# Patient Record
Sex: MALE | Race: WHITE | NOT HISPANIC OR LATINO | Employment: FULL TIME | URBAN - METROPOLITAN AREA
[De-identification: names, ages, dates, MRNs, and addresses within clinical notes are randomized per-mention and may not be internally consistent; named-entity substitution may affect disease eponyms.]

---

## 2022-04-01 ENCOUNTER — OFFICE VISIT (OUTPATIENT)
Dept: FAMILY MEDICINE | Facility: CLINIC | Age: 32
End: 2022-04-01
Payer: COMMERCIAL

## 2022-04-01 VITALS
WEIGHT: 273.13 LBS | RESPIRATION RATE: 14 BRPM | DIASTOLIC BLOOD PRESSURE: 89 MMHG | BODY MASS INDEX: 38.24 KG/M2 | OXYGEN SATURATION: 96 % | HEIGHT: 71 IN | HEART RATE: 94 BPM | TEMPERATURE: 98.3 F | SYSTOLIC BLOOD PRESSURE: 139 MMHG

## 2022-04-01 DIAGNOSIS — E66.09 CLASS 2 OBESITY DUE TO EXCESS CALORIES WITHOUT SERIOUS COMORBIDITY WITH BODY MASS INDEX (BMI) OF 38.0 TO 38.9 IN ADULT: ICD-10-CM

## 2022-04-01 DIAGNOSIS — Z11.4 SCREENING FOR HIV (HUMAN IMMUNODEFICIENCY VIRUS): ICD-10-CM

## 2022-04-01 DIAGNOSIS — F32.1 MAJOR DEPRESSIVE DISORDER, SINGLE EPISODE, MODERATE (H): ICD-10-CM

## 2022-04-01 DIAGNOSIS — J45.990 EXERCISE-INDUCED ASTHMA: ICD-10-CM

## 2022-04-01 DIAGNOSIS — F90.2 ATTENTION DEFICIT HYPERACTIVITY DISORDER, COMBINED TYPE: Primary | ICD-10-CM

## 2022-04-01 DIAGNOSIS — Z11.59 NEED FOR HEPATITIS C SCREENING TEST: ICD-10-CM

## 2022-04-01 DIAGNOSIS — R79.89 LOW TESTOSTERONE IN MALE: ICD-10-CM

## 2022-04-01 DIAGNOSIS — F41.1 GENERALIZED ANXIETY DISORDER: ICD-10-CM

## 2022-04-01 DIAGNOSIS — Z86.39 HISTORY OF VITAMIN D DEFICIENCY: ICD-10-CM

## 2022-04-01 DIAGNOSIS — E66.812 CLASS 2 OBESITY DUE TO EXCESS CALORIES WITHOUT SERIOUS COMORBIDITY WITH BODY MASS INDEX (BMI) OF 38.0 TO 38.9 IN ADULT: ICD-10-CM

## 2022-04-01 DIAGNOSIS — H61.23 BILATERAL IMPACTED CERUMEN: ICD-10-CM

## 2022-04-01 PROCEDURE — 82306 VITAMIN D 25 HYDROXY: CPT | Performed by: STUDENT IN AN ORGANIZED HEALTH CARE EDUCATION/TRAINING PROGRAM

## 2022-04-01 PROCEDURE — 84270 ASSAY OF SEX HORMONE GLOBUL: CPT | Performed by: STUDENT IN AN ORGANIZED HEALTH CARE EDUCATION/TRAINING PROGRAM

## 2022-04-01 PROCEDURE — 69209 REMOVE IMPACTED EAR WAX UNI: CPT | Mod: 50 | Performed by: STUDENT IN AN ORGANIZED HEALTH CARE EDUCATION/TRAINING PROGRAM

## 2022-04-01 PROCEDURE — 84403 ASSAY OF TOTAL TESTOSTERONE: CPT | Performed by: STUDENT IN AN ORGANIZED HEALTH CARE EDUCATION/TRAINING PROGRAM

## 2022-04-01 PROCEDURE — 96127 BRIEF EMOTIONAL/BEHAV ASSMT: CPT | Performed by: STUDENT IN AN ORGANIZED HEALTH CARE EDUCATION/TRAINING PROGRAM

## 2022-04-01 PROCEDURE — 86803 HEPATITIS C AB TEST: CPT | Performed by: STUDENT IN AN ORGANIZED HEALTH CARE EDUCATION/TRAINING PROGRAM

## 2022-04-01 PROCEDURE — 99204 OFFICE O/P NEW MOD 45 MIN: CPT | Mod: 25 | Performed by: STUDENT IN AN ORGANIZED HEALTH CARE EDUCATION/TRAINING PROGRAM

## 2022-04-01 PROCEDURE — 36415 COLL VENOUS BLD VENIPUNCTURE: CPT | Performed by: STUDENT IN AN ORGANIZED HEALTH CARE EDUCATION/TRAINING PROGRAM

## 2022-04-01 PROCEDURE — 87389 HIV-1 AG W/HIV-1&-2 AB AG IA: CPT | Performed by: STUDENT IN AN ORGANIZED HEALTH CARE EDUCATION/TRAINING PROGRAM

## 2022-04-01 RX ORDER — ALPRAZOLAM 1 MG
1 TABLET ORAL PRN
COMMUNITY
End: 2022-12-13

## 2022-04-01 RX ORDER — ALBUTEROL SULFATE 90 UG/1
2 AEROSOL, METERED RESPIRATORY (INHALATION) EVERY 6 HOURS
Qty: 18 G | Refills: 11 | Status: SHIPPED | OUTPATIENT
Start: 2022-04-01 | End: 2022-08-15

## 2022-04-01 RX ORDER — DEXTROAMPHETAMINE SACCHARATE, AMPHETAMINE ASPARTATE MONOHYDRATE, DEXTROAMPHETAMINE SULFATE AND AMPHETAMINE SULFATE 5; 5; 5; 5 MG/1; MG/1; MG/1; MG/1
20 CAPSULE, EXTENDED RELEASE ORAL 2 TIMES DAILY
COMMUNITY
End: 2022-04-01

## 2022-04-01 RX ORDER — DEXTROAMPHETAMINE SACCHARATE, AMPHETAMINE ASPARTATE MONOHYDRATE, DEXTROAMPHETAMINE SULFATE AND AMPHETAMINE SULFATE 5; 5; 5; 5 MG/1; MG/1; MG/1; MG/1
20 CAPSULE, EXTENDED RELEASE ORAL EVERY MORNING
Qty: 30 CAPSULE | Refills: 0 | Status: SHIPPED | OUTPATIENT
Start: 2022-04-01 | End: 2022-04-19

## 2022-04-01 RX ORDER — PROPRANOLOL HYDROCHLORIDE 10 MG/1
TABLET ORAL PRN
COMMUNITY
End: 2022-10-11

## 2022-04-01 RX ORDER — ALBUTEROL SULFATE 1.25 MG/3ML
1.25 SOLUTION RESPIRATORY (INHALATION) EVERY 6 HOURS PRN
COMMUNITY
End: 2022-06-17

## 2022-04-01 RX ORDER — DEXTROAMPHETAMINE SACCHARATE, AMPHETAMINE ASPARTATE, DEXTROAMPHETAMINE SULFATE AND AMPHETAMINE SULFATE 5; 5; 5; 5 MG/1; MG/1; MG/1; MG/1
20 TABLET ORAL 3 TIMES DAILY
COMMUNITY
End: 2022-06-17

## 2022-04-01 ASSESSMENT — PATIENT HEALTH QUESTIONNAIRE - PHQ9
5. POOR APPETITE OR OVEREATING: MORE THAN HALF THE DAYS
SUM OF ALL RESPONSES TO PHQ QUESTIONS 1-9: 13

## 2022-04-01 ASSESSMENT — ANXIETY QUESTIONNAIRES
5. BEING SO RESTLESS THAT IT IS HARD TO SIT STILL: NEARLY EVERY DAY
3. WORRYING TOO MUCH ABOUT DIFFERENT THINGS: SEVERAL DAYS
2. NOT BEING ABLE TO STOP OR CONTROL WORRYING: SEVERAL DAYS
6. BECOMING EASILY ANNOYED OR IRRITABLE: SEVERAL DAYS
GAD7 TOTAL SCORE: 10
7. FEELING AFRAID AS IF SOMETHING AWFUL MIGHT HAPPEN: SEVERAL DAYS
1. FEELING NERVOUS, ANXIOUS, OR ON EDGE: SEVERAL DAYS
IF YOU CHECKED OFF ANY PROBLEMS ON THIS QUESTIONNAIRE, HOW DIFFICULT HAVE THESE PROBLEMS MADE IT FOR YOU TO DO YOUR WORK, TAKE CARE OF THINGS AT HOME, OR GET ALONG WITH OTHER PEOPLE: SOMEWHAT DIFFICULT

## 2022-04-01 ASSESSMENT — PAIN SCALES - GENERAL: PAINLEVEL: NO PAIN (0)

## 2022-04-01 ASSESSMENT — ASTHMA QUESTIONNAIRES: ACT_TOTALSCORE: 24

## 2022-04-01 NOTE — LETTER
[facility]    [address] [place] [address]  [phone]                                   April 6, 2022    Justin Tolbert  [address] [address]        Dear Justin,    The results of your recent labs were within normal limits.    Results for orders placed or performed in visit on 04/01/22   Vitamin D Deficiency     Status: Normal   Result Value Ref Range    Vitamin D, Total (25-Hydroxy) 35 20 - 75 ug/L    Narrative    Season, race, dietary intake, and treatment affect the concentration of 25-hydroxy-Vitamin D. Values may decrease during winter months and increase during summer months. Values 20-29 ug/L may indicate Vitamin D insufficiency and values <20 ug/L may indicate Vitamin D deficiency.    Vitamin D determination is routinely performed by an immunoassay specific for 25 hydroxyvitamin D3.  If an individual is on vitamin D2(ergocalciferol) supplementation, please specify 25 OH vitamin D2 and D3 level determination by LCMSMS test VITD23.     Hepatitis C Screen Reflex to HCV RNA Quant and Genotype     Status: Normal   Result Value Ref Range    Hepatitis C Antibody Nonreactive Nonreactive    Narrative    Assay performance characteristics have not been established for newborns, infants, and children.   HIV Antigen Antibody Combo     Status: Normal   Result Value Ref Range    HIV Antigen Antibody Combo Nonreactive Nonreactive   Sex Hormone Binding Globulin     Status: Normal   Result Value Ref Range    Sex Hormone Binding Globulin 20 11 - 80 nmol/L   Testosterone Free and Total     Status: None   Result Value Ref Range    Free Testosterone Calculated 9.92 ng/dL    Testosterone Total 380 240 - 950 ng/dL    Narrative    This test was developed and its performance characteristics determined by the [facility],  Special Chemistry Laboratory. It has not been cleared or approved by the FDA. The laboratory is regulated under CLIA as qualified to perform  high-complexity testing. This test is used for clinical purposes. It should not be regarded as investigational or for research.   Testosterone Free and Total     Status: None    Narrative    The following orders were created for panel order Testosterone Free and Total.  Procedure                               Abnormality         Status                     ---------                               -----------         ------                     Sex Hormone Binding Glob...[[id number]]  Normal              Final result               Testosterone Free and Total[[id number]]                      Final result                 Please view results for these tests on the individual orders.   Urine Drugs of Abuse Screen *Canceled*     Status: None ()    Narrative    The following orders were created for panel order Urine Drugs of Abuse Screen.  Procedure                               Abnormality         Status                     ---------                               -----------         ------                     Urine Drugs of Abuse Scr...[[id number]]                                                   Please view results for these tests on the individual orders.       If you have any questions please call the clinic at [phone]    Sincerely,    Estephania Mg MD,MPH  bmd

## 2022-04-01 NOTE — PROGRESS NOTES
"  Assessment & Plan   1. Attention deficit hyperactivity disorder, combined type  uncontrolled.  First diagnosed at the age of 24.  - Urine Drugs of Abuse Screen; Future  - amphetamine-dextroamphetamine (ADDERALL XR) 20 MG 24 hr capsule; Take 1 capsule (20 mg) by mouth every morning  Dispense: 30 capsule; Refill: 0    2. Screening for HIV (human immunodeficiency virus)    - HIV Antigen Antibody Combo; Future  - HIV Antigen Antibody Combo    3. Need for hepatitis C screening test    - Hepatitis C Screen Reflex to HCV RNA Quant and Genotype; Future  - Hepatitis C Screen Reflex to HCV RNA Quant and Genotype    4. History of vitamin D deficiency  - Vitamin D Deficiency; Future  - Vitamin D Deficiency    5. Low testosterone in male    - Testosterone Free and Total; Future, done at 10 AM  - Testosterone Free and Total    6. Exercise-induced asthma  controlled  - albuterol (PROAIR HFA/PROVENTIL HFA/VENTOLIN HFA) 108 (90 Base) MCG/ACT inhaler; Inhale 2 puffs into the lungs every 6 hours  Dispense: 18 g; Refill: 11    7. Bilateral impacted cerumen    - CT REMOVAL IMPACTED CERUMEN IRRIGATION/LVG UNILAT  #8 generalized anxiety  He Is requesting for alprazolam.  Advised patient that alprazolam is not the best medication to control his symptoms due its side effect.  Recommended starting SNRI however patient declined at this time and he is open to revisiting this at his next visit  9.  Moderate major depression  As above.  He  Has idone therapy in the past.  In addition to pharmacotherapy, I recommended psychotherapy for ADHD, generalized anxiety and moderate depression.  Patient declined at this time.   He is open to revisiting this at his next visit        BMI:   Estimated body mass index is 38.64 kg/m  as calculated from the following:    Height as of this encounter: 1.791 m (5' 10.5\").    Weight as of this encounter: 123.9 kg (273 lb 2 oz).   Weight management plan: Discussed healthy diet and exercise guidelines    Depression " Screening Follow Up    PHQ 4/1/2022   PHQ-9 Total Score 13   Q9: Thoughts of better off dead/self-harm past 2 weeks Not at all     Last PHQ-9 4/1/2022   1.  Little interest or pleasure in doing things 1   2.  Feeling down, depressed, or hopeless 2   3.  Trouble falling or staying asleep, or sleeping too much 3   4.  Feeling tired or having little energy 1   5.  Poor appetite or overeating 0   6.  Feeling bad about yourself 1   7.  Trouble concentrating 3   8.  Moving slowly or restless 2   Q9: Thoughts of better off dead/self-harm past 2 weeks 0   PHQ-9 Total Score 13   Difficulty at work, home, or with people Somewhat difficult       Follow Up Actions Taken  Patient counseled, no additional follow up at this time.  Depression Action Plan reviewed with patient.  Patient declined referral.       Return in about 1 week (around 4/8/2022).    Estephania Mg MD  [facility] IVÁN Anderson is a 31 year old who presents for the following health issues     History of Present Illness     Asthma:  He presents for follow up of asthma.  He has no cough, no wheezing, and no shortness of breath. He is using a relief medication a few times a month. He does not miss any doses of his controller medication throughout the week.Patient is aware of the following triggers: exercise or sports. The patient has not had a visit to the Emergency Room, Urgent Care or Hospital due to asthma since the last clinic visit.     He eats 2-3 servings of fruits and vegetables daily.He consumes 2 sweetened beverage(s) daily.He exercises with enough effort to increase his heart rate 30 to 60 minutes per day.  He exercises with enough effort to increase his heart rate 5 days per week.   He is taking medications regularly.       Medication Followup of Adderall     Taking Medication as prescribed: pt does not have medication    Side Effects:  None    Medication Helping Symptoms:  yes     Please answer the questions  below, rating yourself on each of the criteria shown using the scale on the right side of the page. As you answer each question, place an X in the box that best describes how you have felt and conducted yourself over the past 6 months.     Never Rarely Sometimes Often Very Often   1 How often do you have trouble wrapping up the final details of a project once the challenging parts have been done?    x    2 How often do you have difficulty getting things in order when you have to do a task that requires organization?    x    3 How often do you have problems remembering appointments or obligations?     x   4 When you have a task that requires a lot of thought, how often do you avoid or delay getting started?     x   5 How often do you fidget or squirm with your hands or feet when you have to sit down for a long time?    x    6 How often do you feel overly active and compelled to do things, like you were driven by a motor?    x    7 How often do you make careless mistakes when you have to work on a boring or difficult project?     x   8 How often do you have difficulty keeping your attention when you are doing boring or repetitive work?     x   9 How often do you have difficulty concentrating on what people say to you, even when they are speaking to you directly?     x   10 How often do you misplace or have difficulty finding things at home or at work?     x   11 How often are you distracted by activity or noise around you?     x   12 How often do you leave your seat in meetings or other situations in which you are expected to remain seated?   x     13 How often do you feel restless or fidgety?     x   14 How often do you have difficulty unwinding and relaxing when you have time to yourself?     x   15 How often do you find yourself talking too much when you are in social situations?    x    16 When you're in a conversation, how often do you find yourself finishing the sentences of the people you are talking to, before  "they can finish them themselves?     x   17 How often do you have difficulty waiting your turn in situations when turn-taking is required?    x    18 How often do you interrupt others when they are busy?   x       Depression/anxiety: Patient denies suicidal homicidal ideation.  Patient was on alprazolam as needed in the past.  he has not being on medication since the start of Covid.  He has been self managing it    He takes marijuana once in a while.  Patient reports history of low testosterone  Obesity: Comorbid  Includes asthma.    Concern:  Pt would like to have R ear looked at. Plugged X 1 week. Ear pain. OTC pain reliever.     Review of Systems   Constitutional, HEENT, cardiovascular, pulmonary, gi and gu systems are negative, except as otherwise noted.      Objective    BP (!) 150/91   Pulse 94   Temp 98.3  F (36.8  C) (Tympanic)   Resp 14   Ht 1.791 m (5' 10.5\")   Wt 123.9 kg (273 lb 2 oz)   SpO2 96%   BMI 38.64 kg/m    Body mass index is 38.64 kg/m .  Physical Exam   GENERAL: healthy, alert and no distress  Ear: Cerumen impaction bilaterally  NECK: no adenopathy and thyroid normal to palpation  RESP: lungs clear to auscultation - no rales, rhonchi or wheezes  CV: regular rate and rhythm, normal S1 S2, no S3 or S4,   ABDOMEN: soft, nontender, no hepatosplenomegaly, no masses and bowel sounds normal  MS: no gross musculoskeletal defects noted, no edema  SKIN: no suspicious lesions or rashes  NEURO:  speech normal  PSYCH: mentation appears normal, affect normal/bright        "

## 2022-04-01 NOTE — LETTER
Opioid / Opioid Plus Controlled Substance Agreement    This is an agreement between you and your provider about the safe and appropriate use of controlled substance/opioids prescribed by your care team. Controlled substances are medicines that can cause physical and mental dependence (abuse).    There are strict laws about having and using these medicines. We here at [facility] are committing to working with you in your efforts to get better. To support you in this work, we ll help you schedule regular office appointments for medicine refills. If we must cancel or change your appointment for any reason, we ll make sure you have enough medicine to last until your next appointment.     As a Provider, I will:    Listen carefully to your concerns and treat you with respect.     Recommend a treatment plan that I believe is in your best interest. This plan may involve therapies other than opioid pain medication.     Talk with you often about the possible benefits, and the risk of harm of any medicine that we prescribe for you.     Provide a plan on how to taper (discontinue or go off) using this medicine if the decision is made to stop its use.    As a Patient, I understand that opioid(s):     Are a controlled substance prescribed by my care team to help me function or work and manage my condition(s).     Are strong medicines and can cause serious side effects such as:    Drowsiness, which can seriously affect my driving ability    A lower breathing rate, enough to cause death    Harm to my thinking ability     Depression     Abuse of and addiction to this medicine    Need to be taken exactly as prescribed. Combining opioids with certain medicines or chemicals (such as illegal drugs, sedatives, sleeping pills, and benzodiazepines) can be dangerous or even fatal. If I stop opioids suddenly, I may have severe withdrawal symptoms.    Do not work for all types of pain nor for all patients. If they re not helpful, I may  be asked to stop them.        The risks, benefits and side effects of these medicine(s) were explained to me. I agree that:  1. I will take part in other treatments as advised by my care team. This may be psychiatry or counseling, physical therapy, behavioral therapy, group treatment or a referral to a specialist.     2. I will keep all my appointments. I understand that this is part of the monitoring of opioids. My care team may require an office visit for EVERY opioid/controlled substance refill. If I miss appointments or don t follow instructions, my care team may stop my medicine.    3. I will take my medicines as prescribed. I will not change the dose or schedule unless my care team tells me to. There will be no refills if I run out early.     4. I may be asked to come to the clinic and complete a urine drug test or complete a pill count at any time. If I don t give a urine sample or participate in a pill count, the care team may stop my medicine.    5. I will only receive prescriptions from this clinic for chronic pain. If I am treated by another provider for acute pain issues, I will tell them that I am taking opioid pain medication for chronic pain and that I have a treatment agreement with this provider. I will inform my [facility] care team within one business day if I am given a prescription for any pain medication by another healthcare provider. My [facility] care team can contact other providers and pharmacists about my use of any medicines.    6. It is up to me to make sure that I don t run out of my medicines on weekends or holidays. If my care team is willing to refill my opioid prescription without a visit, I must request refills only during office hours. Refills may take up to 3 business days to process. I will use one pharmacy to fill all my opioid and other controlled substance prescriptions. I will notify the clinic about any changes to my insurance or medication  availability.    7. I am responsible for my prescriptions. If the medicine/prescription is lost, stolen or destroyed, it will not be replaced. I also agree not to share controlled substance medicines with anyone.    8. I am aware I should not use any illegal or recreational drugs. I agree not to drink alcohol unless my care team says I can.       9. If I enroll in the [place] [facility] Cannabis program, I will tell my care team prior to my next refill.     10. I will tell my care team right away if I become pregnant, have a new medical problem treated outside of my regular clinic, or have a change in my medications.    11. I understand that this medicine can affect my thinking, judgment and reaction time. Alcohol and drugs affect the brain and body, which can affect the safety of my driving. Being under the influence of alcohol or drugs can affect my decision-making, behaviors, personal safety, and the safety of others. Driving while impaired (DWI) can occur if a person is driving, operating, or in physical control of a car, motorcycle, boat, snowmobile, ATV, motorbike, off-road vehicle, or any other motor vehicle ([place] Statute 169A.20). I understand the risk if I choose to drive or operate any vehicle or machinery.    I understand that if I do not follow any of the conditions above, my prescriptions or treatment may be stopped or changed.          Opioids  What You Need to Know    What are opioids?   Opioids are pain medicines that must be prescribed by a doctor. They are also known as narcotics.     Examples are:   1. morphine (MS Contin, Carissa)  2. oxycodone (Oxycontin)  3. oxycodone and acetaminophen (Percocet)  4. hydrocodone and acetaminophen (Vicodin, Norco)   5. fentanyl patch (Duragesic)   6. hydromorphone (Dilaudid)   7. methadone  8. codeine (Tylenol #3)     What do opioids do well?   Opioids are best for severe short-term pain such as after a surgery or injury. They may work well for cancer pain. They may  help some people with long-lasting (chronic) pain.     What do opioids NOT do well?   Opioids never get rid of pain entirely, and they don t work well for most patients with chronic pain. Opioids don t reduce swelling, one of the causes of pain.                                    Other ways to manage chronic pain and improve function include:       Treat the health problem that may be causing pain    Anti-inflammation medicines, which reduce swelling and tenderness, such as ibuprofen (Advil, Motrin) or naproxen (Aleve)    Acetaminophen (Tylenol)    Antidepressants and anti-seizure medicines, especially for nerve pain    Topical treatments such as patches or creams    Injections or nerve blocks    Chiropractic or osteopathic treatment    Acupuncture, massage, deep breathing, meditation, visual imagery, aromatherapy    Use heat or ice at the pain site    Physical therapy     Exercise    Stop smoking    Take part in therapy       Risks and side effects     Talk to your doctor before you start or decide to keep taking opioids. Possible side effects include:      Lowering your breathing rate enough to cause death    Overdose, including death, especially if taking higher than prescribed doses    Worse depression symptoms; less pleasure in things you usually enjoy    Feeling tired or sluggish    Slower thoughts or cloudy thinking    Being more sensitive to pain over time; pain is harder to control    Trouble sleeping or restless sleep    Changes in hormone levels (for example, less testosterone)    Changes in sex drive or ability to have sex    Constipation    Unsafe driving    Itching and sweating    Dizziness    Nausea, throwing up and dry mouth    What else should I know about opioids?    Opioids may lead to dependence, tolerance, or addiction.      Dependence means that if you stop or reduce the medicine too quickly, you will have withdrawal symptoms. These include loose poop (diarrhea), jitters, flu-like symptoms,  nervousness and tremors. Dependence is not the same as addiction.                       Tolerance means needing higher doses over time to get the same effect. This may increase the chance of serious side effects.      Addiction is when people improperly use a substance that harms their body, their mind or their relations with others. Use of opiates can cause a relapse of addiction if you have a history of drug or alcohol abuse.      People who have used opioids for a long time may have a lower quality of life, worse depression, higher levels of pain and more visits to doctors.    You can overdose on opioids. Take these steps to lower your risk of overdose:    1. Recognize the signs:  Signs of overdose include decrease or loss of consciousness (blackout), slowed breathing, trouble waking up and blue lips. If someone is worried about overdose, they should call 911.    2. Talk to your doctor about Narcan (naloxone).   If you are at risk for overdose, you may be given a prescription for Narcan. This medicine very quickly reverses the effects of opioids.   If you overdose, a friend or family member can give you Narcan while waiting for the ambulance. They need to know the signs of overdose and how to give Narcan.     3. Don't use alcohol or street drugs.   Taking them with opioids can cause death.    4. Do not take any of these medicines unless your doctor says it s OK. Taking these with opioids can cause death:    Benzodiazepines, such as lorazepam (Ativan), alprazolam (Xanax) or diazepam (Valium)    Muscle relaxers, such as cyclobenzaprine (Flexeril)    Sleeping pills like zolpidem (Ambien)     Other opioids      How to keep you and other people safe while taking opioids:    1. Never share your opioids with others.  Opioid medicines are regulated by the Drug Enforcement Agency (REGINO). Selling or sharing medications is a criminal act.    2. Be sure to store opioids in a secure place, locked up if possible. Young children  can easily swallow them and overdose.    3. When you are traveling with your medicines, keep them in the original bottles. If you use a pill box, be sure you also carry a copy of your medicine list from your clinic or pharmacy.    4. Safe disposal of opioids    Most pharmacies have places to get rid of medicine, called disposal kiosks. Medicine disposal options are also available in every [facility]. Search your county and  medication disposal  to find more options. You can find more details at:  [email or web address]     I agree that my provider, clinic care team, and pharmacy may work with any city, state or federal law enforcement agency that investigates the misuse, sale, or other diversion of my controlled medicine. I will allow my provider to discuss my care with, or share a copy of, this agreement with any other treating provider, pharmacy or emergency room where I receive care.    I have read this agreement and have asked questions about anything I did not understand.    _______________________________________________________  Patient Signature - Justin Tolbert _____________________                   Date     _______________________________________________________  Provider Signature - Estephania Mg MD   _____________________                   Date     _______________________________________________________  Witness Signature (required if provider not present while patient signing)   _____________________                   Date

## 2022-04-01 NOTE — PATIENT INSTRUCTIONS
Robe Anderson,    Thank you for allowing [facility] to manage your care.    I ordered some blood work, please go to the laboratory to get your laboratory studies.    I sent your prescriptions to your pharmacy.      For your convenience, test results are released as soon as they are available  Please allow 1-2 business days for me to send you a comment about your results.  If not done so, I encourage you to login into [employer] ([email or web address] to review your results in real time.     If you have any questions or concerns, please feel free to call us at [phone].    Sincerely,    Dr. Mg    Did you know?      You can schedule a video visit for follow-up appointments as well as future appointments for certain conditions.  Please see the below link.     [email or web address]    If you have not already done so,  I encourage you to sign up for [employer] ([email or web address]).  This will allow you to review your results, securely communicate with a provider, and schedule virtual visits as well.

## 2022-04-02 ASSESSMENT — ANXIETY QUESTIONNAIRES: GAD7 TOTAL SCORE: 10

## 2022-04-03 LAB
DEPRECATED CALCIDIOL+CALCIFEROL SERPL-MC: 35 UG/L (ref 20–75)
HCV AB SERPL QL IA: NONREACTIVE
HIV 1+2 AB+HIV1 P24 AG SERPL QL IA: NONREACTIVE
SHBG SERPL-SCNC: 20 NMOL/L (ref 11–80)

## 2022-04-05 LAB
TESTOST FREE SERPL-MCNC: 9.92 NG/DL
TESTOST SERPL-MCNC: 380 NG/DL (ref 240–950)

## 2022-04-19 ENCOUNTER — VIRTUAL VISIT (OUTPATIENT)
Dept: FAMILY MEDICINE | Facility: CLINIC | Age: 32
End: 2022-04-19
Payer: COMMERCIAL

## 2022-04-19 DIAGNOSIS — R79.89 LOW TESTOSTERONE IN MALE: ICD-10-CM

## 2022-04-19 DIAGNOSIS — F90.2 ATTENTION DEFICIT HYPERACTIVITY DISORDER, COMBINED TYPE: Primary | ICD-10-CM

## 2022-04-19 DIAGNOSIS — M79.671 RIGHT FOOT PAIN: ICD-10-CM

## 2022-04-19 PROCEDURE — 99214 OFFICE O/P EST MOD 30 MIN: CPT | Mod: 95 | Performed by: PHYSICIAN ASSISTANT

## 2022-04-19 RX ORDER — DEXTROAMPHETAMINE SACCHARATE, AMPHETAMINE ASPARTATE MONOHYDRATE, DEXTROAMPHETAMINE SULFATE AND AMPHETAMINE SULFATE 5; 5; 5; 5 MG/1; MG/1; MG/1; MG/1
20 CAPSULE, EXTENDED RELEASE ORAL 2 TIMES DAILY
Qty: 60 CAPSULE | Refills: 0 | Status: SHIPPED | OUTPATIENT
Start: 2022-04-19 | End: 2022-05-09

## 2022-04-19 NOTE — PROGRESS NOTES
Justin is a 31 year old who is being evaluated via a billable video visit.      How would you like to obtain your AVS? MyChart  If the video visit is dropped, the invitation should be resent by: Text to cell phone: [phone]  Will anyone else be joining your video visit? No    Video Start Time: 2:09 PM        Subjective   Justin is a 31 year old who presents for the following health issues    History of Present Illness       Reason for visit:  Medication Correction + Fill  Symptom onset:  More than a month  Symptoms include:  ADHD  Symptom intensity:  Severe  Symptom progression:  Staying the same  Had these symptoms before:  Yes  Has tried/received treatment for these symptoms:  Yes  Previous treatment was successful:  Yes  Prior treatment description:  ((2) 20mg XR + (2) 10mg IR)/day of Adderall;  What makes it worse:  Alternatives such as Vyvanse and Concerta- I broke out in rashes  What makes it better:  Taking my medication on a daily basis as my psychiatrist I worked with to find what worked for me in regards to medication    He eats 4 or more servings of fruits and vegetables daily.He consumes 1 sweetened beverage(s) daily.He exercises with enough effort to increase his heart rate 30 to 60 minutes per day.  He exercises with enough effort to increase his heart rate 5 days per week.   He is taking medications regularly.     Started back on adderall xr 20 mg daily. Has helped some, but still some inattention issues. Doing a little better with task completion.     Please answer the questions below, rating yourself on each of the criteria shown using the scale on the right side of the page. As you answer each question, place an X in the box that best describes how you have felt and conducted yourself over the past 6 months.     Never Rarely Sometimes Often Very Often   1 How often do you have trouble wrapping up the final details of a project once the challenging parts have been done?     x   2 How often do you  have difficulty getting things in order when you have to do a task that requires organization?     x   3 How often do you have problems remembering appointments or obligations?     x   4 When you have a task that requires a lot of thought, how often do you avoid or delay getting started?     x   5 How often do you fidget or squirm with your hands or feet when you have to sit down for a long time?    x    6 How often do you feel overly active and compelled to do things, like you were driven by a motor?    x    7 How often do you make careless mistakes when you have to work on a boring or difficult project?    x    8 How often do you have difficulty keeping your attention when you are doing boring or repetitive work?     x   9 How often do you have difficulty concentrating on what people say to you, even when they are speaking to you directly?     x   10 How often do you misplace or have difficulty finding things at home or at work?     x   11 How often are you distracted by activity or noise around you?     x   12 How often do you leave your seat in meetings or other situations in which you are expected to remain seated?   x     13 How often do you feel restless or fidgety?     x   14 How often do you have difficulty unwinding and relaxing when you have time to yourself?     x   15 How often do you find yourself talking too much when you are in social situations?     x   16 When you're in a conversation, how often do you find yourself finishing the sentences of the people you are talking to, before they can finish them themselves?     x   17 How often do you have difficulty waiting your turn in situations when turn-taking is required?     x   18 How often do you interrupt others when they are busy?   x         Review of Systems   Constitutional, HEENT, cardiovascular, pulmonary, GI, , musculoskeletal, neuro, skin, endocrine and psych systems are negative, except as otherwise noted.      Objective            Vitals:  No vitals were obtained today due to virtual visit.    Physical Exam   GENERAL: Healthy, alert and no distress  EYES: Eyes grossly normal to inspection.  No discharge or erythema, or obvious scleral/conjunctival abnormalities.  RESP: No audible wheeze, cough, or visible cyanosis.  No visible retractions or increased work of breathing.    SKIN: Visible skin clear. No significant rash, abnormal pigmentation or lesions.  NEURO: Cranial nerves grossly intact.  Mentation and speech appropriate for age.  PSYCH: Mentation appears normal, affect normal/bright, judgement and insight intact, normal speech and appearance well-groomed.    Justin was seen today for a.d.h.d.    Diagnoses and all orders for this visit:    Attention deficit hyperactivity disorder, combined type  -     amphetamine-dextroamphetamine (ADDERALL XR) 20 MG 24 hr capsule; Take 1 capsule (20 mg) by mouth 2 times daily    Low testosterone in male  -     Testosterone Free and Total; Future    Right foot pain  -     diclofenac (VOLTAREN) 1 % topical gel; Apply 4 g topically 4 times daily      Increase dose of adderall xr to one 20 mg tab bid.  Recheck in 1 mos     Video-Visit Details    Type of service:  Video Visit    Video End Time:2:38 PM    Originating Location (pt. Location): Home    Distant Location (provider location):  [facility]     Platform used for Video Visit: [id number]

## 2022-05-06 ENCOUNTER — TELEPHONE (OUTPATIENT)
Dept: FAMILY MEDICINE | Facility: CLINIC | Age: 32
End: 2022-05-06
Payer: COMMERCIAL

## 2022-05-06 NOTE — TELEPHONE ENCOUNTER
Per Beba, patient picked this up on 04/19/22.      Disp Refills Start End CHELSEY   amphetamine-dextroamphetamine (ADDERALL XR) 20 MG 24 hr capsule 60 capsule 0 4/19/2022

## 2022-05-06 NOTE — TELEPHONE ENCOUNTER
Patient stated that he had an appointment with provider yesterday and one of his meds were not sent to the pharmacy.    He stated that Adderall 10 mg tabs were sent, but the Adderall 20 mg extended release tabs were not.      Routed to PCP to please advise      Marielle TAPIA,BSN  Triage Nurse  [facility]: [facility]  Ph: [phone]

## 2022-05-06 NOTE — TELEPHONE ENCOUNTER
Call pharmacy and see when his adderall xr 20 mg tabs were last refilled. According to epic they were filled last on 4/19 which means he's not due till 5/19

## 2022-05-06 NOTE — TELEPHONE ENCOUNTER
Called [facility] pharmacy and spoke with Olaf. Informed Olaf per Joe Patel refill request too soon.  Olaf stated she will let patient know.

## 2022-05-09 ENCOUNTER — MYC REFILL (OUTPATIENT)
Dept: FAMILY MEDICINE | Facility: CLINIC | Age: 32
End: 2022-05-09
Payer: COMMERCIAL

## 2022-05-09 DIAGNOSIS — F90.2 ATTENTION DEFICIT HYPERACTIVITY DISORDER, COMBINED TYPE: ICD-10-CM

## 2022-05-09 NOTE — TELEPHONE ENCOUNTER
Patient called needs ASAP going out of town for work and will run out .  Gabriella Valdivia  [employer],

## 2022-05-10 RX ORDER — DEXTROAMPHETAMINE SACCHARATE, AMPHETAMINE ASPARTATE MONOHYDRATE, DEXTROAMPHETAMINE SULFATE AND AMPHETAMINE SULFATE 5; 5; 5; 5 MG/1; MG/1; MG/1; MG/1
20 CAPSULE, EXTENDED RELEASE ORAL 2 TIMES DAILY
Qty: 60 CAPSULE | Refills: 0 | Status: SHIPPED | OUTPATIENT
Start: 2022-05-10 | End: 2022-06-17

## 2022-05-17 NOTE — TELEPHONE ENCOUNTER
Patient calling to ask if script refilled?  Informed patient script refilled 5/10/22. He will call his jah'sophia Martinez RN  [facility]

## 2022-05-22 ENCOUNTER — HEALTH MAINTENANCE LETTER (OUTPATIENT)
Age: 32
End: 2022-05-22

## 2022-06-17 ENCOUNTER — VIRTUAL VISIT (OUTPATIENT)
Dept: FAMILY MEDICINE | Facility: CLINIC | Age: 32
End: 2022-06-17
Payer: COMMERCIAL

## 2022-06-17 DIAGNOSIS — F90.2 ATTENTION DEFICIT HYPERACTIVITY DISORDER, COMBINED TYPE: ICD-10-CM

## 2022-06-17 PROCEDURE — 99213 OFFICE O/P EST LOW 20 MIN: CPT | Mod: 95 | Performed by: PHYSICIAN ASSISTANT

## 2022-06-17 RX ORDER — DEXTROAMPHETAMINE SACCHARATE, AMPHETAMINE ASPARTATE MONOHYDRATE, DEXTROAMPHETAMINE SULFATE AND AMPHETAMINE SULFATE 5; 5; 5; 5 MG/1; MG/1; MG/1; MG/1
20 CAPSULE, EXTENDED RELEASE ORAL 2 TIMES DAILY
Qty: 60 CAPSULE | Refills: 0 | Status: SHIPPED | OUTPATIENT
Start: 2022-06-17 | End: 2022-07-15

## 2022-06-17 RX ORDER — DEXTROAMPHETAMINE SACCHARATE, AMPHETAMINE ASPARTATE, DEXTROAMPHETAMINE SULFATE AND AMPHETAMINE SULFATE 2.5; 2.5; 2.5; 2.5 MG/1; MG/1; MG/1; MG/1
10 TABLET ORAL EVERY MORNING
Qty: 30 TABLET | Refills: 0 | Status: SHIPPED | OUTPATIENT
Start: 2022-06-17 | End: 2022-07-15

## 2022-06-17 NOTE — PROGRESS NOTES
Justin is a 31 year old who is being evaluated via a billable video visit.      How would you like to obtain your AVS? Mail a copy  If the video visit is dropped, the invitation should be resent by: Text to cell phone: [phone]  Will anyone else be joining your video visit? No              Subjective   Justin is a 31 year old presenting for the following health issues:  No chief complaint on file.      HPI     Medication Followup of Adderall    Taking Medication as prescribed: yes    Side Effects:  None    Medication Helping Symptoms:  yes  Please answer the questions below, rating yourself on each of the criteria shown using the scale on the right side of the page. As you answer each question, place an X in the box that best describes how you have felt and conducted yourself over the past 6 months.   Added in adderall IR in the middle of the day as a bridge between doses of his adderall xr.  No side effects. Well tolerated. Working  well to manage target symptoms of inattention and task completion.     Never Rarely Sometimes Often Very Often   1 How often do you have trouble wrapping up the final details of a project once the challenging parts have been done?  x      2 How often do you have difficulty getting things in order when you have to do a task that requires organization?  x      3 How often do you have problems remembering appointments or obligations?   x     4 When you have a task that requires a lot of thought, how often do you avoid or delay getting started?   x     5 How often do you fidget or squirm with your hands or feet when you have to sit down for a long time?  x      6 How often do you feel overly active and compelled to do things, like you were driven by a motor?  x      7 How often do you make careless mistakes when you have to work on a boring or difficult project?  x      8 How often do you have difficulty keeping your attention when you are doing boring or repetitive work?  x      9 How  often do you have difficulty concentrating on what people say to you, even when they are speaking to you directly?   x     10 How often do you misplace or have difficulty finding things at home or at work?    x    11 How often are you distracted by activity or noise around you?    x    12 How often do you leave your seat in meetings or other situations in which you are expected to remain seated?  x      13 How often do you feel restless or fidgety?  x      14 How often do you have difficulty unwinding and relaxing when you have time to yourself?   x     15 How often do you find yourself talking too much when you are in social situations?   x     16 When you're in a conversation, how often do you find yourself finishing the sentences of the people you are talking to, before they can finish them themselves?    x    17 How often do you have difficulty waiting your turn in situations when turn-taking is required?  x      18 How often do you interrupt others when they are busy?  x            Review of Systems   Constitutional, HEENT, cardiovascular, pulmonary, GI, , musculoskeletal, neuro, skin, endocrine and psych systems are negative, except as otherwise noted.      Objective           Vitals:  No vitals were obtained today due to virtual visit.    Physical Exam   GENERAL: Healthy, alert and no distress  EYES: Eyes grossly normal to inspection.  No discharge or erythema, or obvious scleral/conjunctival abnormalities.  RESP: No audible wheeze, cough, or visible cyanosis.  No visible retractions or increased work of breathing.    SKIN: Visible skin clear. No significant rash, abnormal pigmentation or lesions.  NEURO: Cranial nerves grossly intact.  Mentation and speech appropriate for age.  PSYCH: Mentation appears normal, affect normal/bright, judgement and insight intact, normal speech and appearance well-groomed.    Justin was seen today for a.d.h.d.    Diagnoses and all orders for this visit:    Attention deficit  hyperactivity disorder, combined type  -     amphetamine-dextroamphetamine (ADDERALL XR) 20 MG 24 hr capsule; Take 1 capsule (20 mg) by mouth 2 times daily  -     amphetamine-dextroamphetamine (ADDERALL) 10 MG tablet; Take 1 tablet (10 mg) by mouth every morning      work on lifestyle modification  Recheck in 6 mos         Video-Visit Details    Video Start Time: 9:18am    Type of service:  Video Visit    Video End Time:9:28am    Originating Location (pt. Location): Home    Distant Location (provider location):  [facility] [place]     Platform used for Video Visit: Myriam    .  Jose.

## 2022-07-15 ENCOUNTER — MYC REFILL (OUTPATIENT)
Dept: FAMILY MEDICINE | Facility: CLINIC | Age: 32
End: 2022-07-15

## 2022-07-15 DIAGNOSIS — F90.2 ATTENTION DEFICIT HYPERACTIVITY DISORDER, COMBINED TYPE: ICD-10-CM

## 2022-07-15 NOTE — TELEPHONE ENCOUNTER
Routing refill request to provider for review/approval because:  Drug not on the [facility] refill protocol     Requested Prescriptions   Pending Prescriptions Disp Refills    amphetamine-dextroamphetamine (ADDERALL XR) 20 MG 24 hr capsule 60 capsule 0     Sig: Take 1 capsule (20 mg) by mouth 2 times daily        There is no refill protocol information for this order        amphetamine-dextroamphetamine (ADDERALL) 10 MG tablet 30 tablet 0     Sig: Take 1 tablet (10 mg) by mouth every morning        There is no refill protocol information for this order            Montse Doyle RN   [facility]

## 2022-07-16 RX ORDER — DEXTROAMPHETAMINE SACCHARATE, AMPHETAMINE ASPARTATE, DEXTROAMPHETAMINE SULFATE AND AMPHETAMINE SULFATE 2.5; 2.5; 2.5; 2.5 MG/1; MG/1; MG/1; MG/1
10 TABLET ORAL EVERY MORNING
Qty: 30 TABLET | Refills: 0 | Status: SHIPPED | OUTPATIENT
Start: 2022-07-16 | End: 2022-08-15

## 2022-07-16 RX ORDER — DEXTROAMPHETAMINE SACCHARATE, AMPHETAMINE ASPARTATE MONOHYDRATE, DEXTROAMPHETAMINE SULFATE AND AMPHETAMINE SULFATE 5; 5; 5; 5 MG/1; MG/1; MG/1; MG/1
20 CAPSULE, EXTENDED RELEASE ORAL 2 TIMES DAILY
Qty: 60 CAPSULE | Refills: 0 | Status: SHIPPED | OUTPATIENT
Start: 2022-07-16 | End: 2022-08-15

## 2022-08-15 ENCOUNTER — MYC REFILL (OUTPATIENT)
Dept: FAMILY MEDICINE | Facility: CLINIC | Age: 32
End: 2022-08-15

## 2022-08-15 DIAGNOSIS — J45.990 EXERCISE-INDUCED ASTHMA: ICD-10-CM

## 2022-08-16 RX ORDER — ALBUTEROL SULFATE 90 UG/1
2 AEROSOL, METERED RESPIRATORY (INHALATION) EVERY 6 HOURS
Qty: 18 G | Refills: 11 | Status: SHIPPED | OUTPATIENT
Start: 2022-08-16 | End: 2024-03-28

## 2022-08-16 NOTE — TELEPHONE ENCOUNTER
"Requested Prescriptions   Pending Prescriptions Disp Refills     albuterol (PROAIR HFA/PROVENTIL HFA/VENTOLIN HFA) 108 (90 Base) MCG/ACT inhaler 18 g 11     Sig: Inhale 2 puffs into the lungs every 6 hours       Asthma Maintenance Inhalers - Anticholinergics Passed - 8/15/2022  1:45 PM        Passed - Patient is age 12 years or older        Passed - Asthma control assessment score within normal limits in last 6 months     Please review ACT score.     ACT Total Scores 4/1/2022   ACT TOTAL SCORE (Goal Greater than or Equal to 20) 24   In the past 12 months, how many times did you visit the emergency room for your asthma without being admitted to the hospital? 0   In the past 12 months, how many times were you hospitalized overnight because of your asthma? 0               Passed - Medication is active on med list        Passed - Recent (6 mo) or future (30 days) visit within the authorizing provider's specialty     Patient had office visit in the last 6 months or has a visit in the next 30 days with authorizing provider or within the authorizing provider's specialty.  See \"Patient Info\" tab in inbasket, or \"Choose Columns\" in Meds & Orders section of the refill encounter.           Short-Acting Beta Agonist Inhalers Protocol  Passed - 8/15/2022  1:45 PM        Passed - Patient is age 12 or older        Passed - Asthma control assessment score within normal limits in last 6 months     Please review ACT score.           Passed - Medication is active on med list        Passed - Recent (6 mo) or future (30 days) visit within the authorizing provider's specialty     Patient had office visit in the last 6 months or has a visit in the next 30 days with authorizing provider or within the authorizing provider's specialty.  See \"Patient Info\" tab in inbasket, or \"Choose Columns\" in Meds & Orders section of the refill encounter.               Prescription approved per [facility] Refill Protocol.    Linnea Carroll RN  [facility]

## 2022-09-25 ENCOUNTER — HEALTH MAINTENANCE LETTER (OUTPATIENT)
Age: 32
End: 2022-09-25

## 2022-10-11 ENCOUNTER — MYC REFILL (OUTPATIENT)
Dept: FAMILY MEDICINE | Facility: CLINIC | Age: 32
End: 2022-10-11

## 2022-10-11 DIAGNOSIS — F90.2 ATTENTION DEFICIT HYPERACTIVITY DISORDER, COMBINED TYPE: ICD-10-CM

## 2022-10-11 DIAGNOSIS — F41.1 GENERALIZED ANXIETY DISORDER: Primary | ICD-10-CM

## 2022-10-14 RX ORDER — PROPRANOLOL HYDROCHLORIDE 10 MG/1
10 TABLET ORAL 3 TIMES DAILY
Qty: 90 TABLET | Refills: 1 | Status: SHIPPED | OUTPATIENT
Start: 2022-10-14

## 2022-10-14 RX ORDER — ALPRAZOLAM 1 MG
1 TABLET ORAL PRN
OUTPATIENT
Start: 2022-10-14

## 2022-10-14 RX ORDER — DEXTROAMPHETAMINE SACCHARATE, AMPHETAMINE ASPARTATE MONOHYDRATE, DEXTROAMPHETAMINE SULFATE AND AMPHETAMINE SULFATE 5; 5; 5; 5 MG/1; MG/1; MG/1; MG/1
20 CAPSULE, EXTENDED RELEASE ORAL 2 TIMES DAILY
Qty: 60 CAPSULE | Refills: 0 | Status: SHIPPED | OUTPATIENT
Start: 2022-10-14 | End: 2022-10-16

## 2022-10-14 RX ORDER — DEXTROAMPHETAMINE SACCHARATE, AMPHETAMINE ASPARTATE, DEXTROAMPHETAMINE SULFATE AND AMPHETAMINE SULFATE 2.5; 2.5; 2.5; 2.5 MG/1; MG/1; MG/1; MG/1
10 TABLET ORAL EVERY MORNING
Qty: 30 TABLET | Refills: 0 | Status: SHIPPED | OUTPATIENT
Start: 2022-10-14 | End: 2022-10-16

## 2022-10-14 NOTE — TELEPHONE ENCOUNTER
"Patient requesting that the following medications:   Amphetamine-dextroamphetamine (ADDERALL XR) 20 MG 24 hr capsule    Amphetamine-dextroamphetamine (ADDERALL) 10 MG tablet    be sent to [facility][id number] - [place] [id number]  AT [address].       Both doses of Adderall are currently on file at   [facility] ([facility] however they do not have these medications in stock.     I asked patient if he had checked to be sure that the [facility] in [place] has these medications in stock. He said he called the pharmacy and was informed that they are unable to give this information on a \"controlled substance.\"     I reminded patient that he is due for his annual physical and Med Check in December. He said he will check to see if his insurance will cover an annual exam & will call back later to schedule an appointment.     Jessie Harvey RN BSN  [facility]    "

## 2022-10-16 RX ORDER — DEXTROAMPHETAMINE SACCHARATE, AMPHETAMINE ASPARTATE MONOHYDRATE, DEXTROAMPHETAMINE SULFATE AND AMPHETAMINE SULFATE 5; 5; 5; 5 MG/1; MG/1; MG/1; MG/1
20 CAPSULE, EXTENDED RELEASE ORAL 2 TIMES DAILY
Qty: 60 CAPSULE | Refills: 0 | Status: SHIPPED | OUTPATIENT
Start: 2022-10-16 | End: 2022-11-14

## 2022-10-16 RX ORDER — DEXTROAMPHETAMINE SACCHARATE, AMPHETAMINE ASPARTATE, DEXTROAMPHETAMINE SULFATE AND AMPHETAMINE SULFATE 2.5; 2.5; 2.5; 2.5 MG/1; MG/1; MG/1; MG/1
10 TABLET ORAL EVERY MORNING
Qty: 30 TABLET | Refills: 0 | Status: SHIPPED | OUTPATIENT
Start: 2022-10-16 | End: 2022-11-14

## 2022-10-18 ENCOUNTER — TELEPHONE (OUTPATIENT)
Dept: FAMILY MEDICINE | Facility: CLINIC | Age: 32
End: 2022-10-18

## 2022-10-18 NOTE — TELEPHONE ENCOUNTER
Patient is requesting a 90 day supply of Propranolol.  Original quantity of 90 now requesting quantity of 270

## 2022-10-19 NOTE — TELEPHONE ENCOUNTER
Duplicate  Refilled by provider on 10/14/22.  Michelle Martinez RN  [facility]

## 2023-01-13 NOTE — PROGRESS NOTES
Justin is a 32 year old who is being evaluated via a billable video visit.      How would you like to obtain your AVS? MyChart  If the video visit is dropped, the invitation should be resent by: Text to cell phone: [phone]  Will anyone else be joining your video visit? No            Subjective   Justin is a 32 year old, presenting for the following health issues:  Recheck Medication (Adderall)      HPI     Medication Followup of Adderall    Taking Medication as prescribed: yes    Side Effects:  None    Medication Helping Symptoms:  yes    Taking and tolerating adderall well. Working out more consistently. Has lost 50 lbs. Helps his attention and sleep.  Working well on his target symptoms of inattention and task completion.       Review of Systems   Constitutional, HEENT, cardiovascular, pulmonary, GI, , musculoskeletal, neuro, skin, endocrine and psych systems are negative, except as otherwise noted.      Objective           Vitals:  No vitals were obtained today due to virtual visit.    Physical Exam   GENERAL: Healthy, alert and no distress  EYES: Eyes grossly normal to inspection.  No discharge or erythema, or obvious scleral/conjunctival abnormalities.  RESP: No audible wheeze, cough, or visible cyanosis.  No visible retractions or increased work of breathing.    SKIN: Visible skin clear. No significant rash, abnormal pigmentation or lesions.  NEURO: Cranial nerves grossly intact.  Mentation and speech appropriate for age.  PSYCH: Mentation appears normal, affect normal/bright, judgement and insight intact, normal speech and appearance well-groomed.    Justin was seen today for recheck medication.    Diagnoses and all orders for this visit:    Attention deficit hyperactivity disorder, combined type    Generalized anxiety disorder  -     ALPRAZolam (XANAX) 1 MG tablet; Take 1 tablet (1 mg) by mouth as needed for anxiety      Continue current doses of adderall.  work on lifestyle modification  Recheck in 6 mos      Video-Visit Details    Type of service:  Video Visit     Originating Location (pt. Location): Home    Distant Location (provider location):  On-site  Platform used for Video Visit: Myriam     none

## 2023-01-17 ENCOUNTER — VIRTUAL VISIT (OUTPATIENT)
Dept: FAMILY MEDICINE | Facility: CLINIC | Age: 33
End: 2023-01-17
Payer: COMMERCIAL

## 2023-01-17 DIAGNOSIS — F41.1 GENERALIZED ANXIETY DISORDER: ICD-10-CM

## 2023-01-17 DIAGNOSIS — F90.2 ATTENTION DEFICIT HYPERACTIVITY DISORDER, COMBINED TYPE: Primary | ICD-10-CM

## 2023-01-17 PROCEDURE — 99213 OFFICE O/P EST LOW 20 MIN: CPT | Mod: 95 | Performed by: PHYSICIAN ASSISTANT

## 2023-01-17 RX ORDER — ALPRAZOLAM 1 MG
1 TABLET ORAL PRN
Qty: 10 TABLET | Refills: 0 | Status: SHIPPED | OUTPATIENT
Start: 2023-01-17 | End: 2023-02-17

## 2023-01-17 ASSESSMENT — ANXIETY QUESTIONNAIRES
5. BEING SO RESTLESS THAT IT IS HARD TO SIT STILL: NOT AT ALL
6. BECOMING EASILY ANNOYED OR IRRITABLE: SEVERAL DAYS
2. NOT BEING ABLE TO STOP OR CONTROL WORRYING: NOT AT ALL
1. FEELING NERVOUS, ANXIOUS, OR ON EDGE: NOT AT ALL
GAD7 TOTAL SCORE: 1
GAD7 TOTAL SCORE: 1
3. WORRYING TOO MUCH ABOUT DIFFERENT THINGS: NOT AT ALL
7. FEELING AFRAID AS IF SOMETHING AWFUL MIGHT HAPPEN: NOT AT ALL
IF YOU CHECKED OFF ANY PROBLEMS ON THIS QUESTIONNAIRE, HOW DIFFICULT HAVE THESE PROBLEMS MADE IT FOR YOU TO DO YOUR WORK, TAKE CARE OF THINGS AT HOME, OR GET ALONG WITH OTHER PEOPLE: NOT DIFFICULT AT ALL

## 2023-01-17 ASSESSMENT — ASTHMA QUESTIONNAIRES: ACT_TOTALSCORE: 23

## 2023-01-17 ASSESSMENT — PATIENT HEALTH QUESTIONNAIRE - PHQ9
5. POOR APPETITE OR OVEREATING: NOT AT ALL
SUM OF ALL RESPONSES TO PHQ QUESTIONS 1-9: 0

## 2023-01-17 NOTE — LETTER
My Asthma Action Plan    Name: Justin Tolbert   YOB: 1990  Date: 1/17/2023   My doctor: Joe Patel PA-C   My clinic: [facility] [place]        My Rescue Medicine:   Albuterol inhaler (Proair/Ventolin/Proventil HFA)  2-4 puffs EVERY 4 HOURS as needed. Use a spacer if recommended by your provider.   My Asthma Severity:   Intermittent / Exercise Induced  Know your asthma triggers:              GREEN ZONE   Good Control    I feel good    No cough or wheeze    Can work, sleep and play without asthma symptoms       Take your asthma control medicine every day.     1. If exercise triggers your asthma, take your rescue medication    15 minutes before exercise or sports, and    During exercise if you have asthma symptoms  2. Spacer to use with inhaler: If you have a spacer, make sure to use it with your inhaler             YELLOW ZONE Getting Worse  I have ANY of these:    I do not feel good    Cough or wheeze    Chest feels tight    Wake up at night   1. Keep taking your Green Zone medications  2. Start taking your rescue medicine:    every 20 minutes for up to 1 hour. Then every 4 hours for 24-48 hours.  3. If you stay in the Yellow Zone for more than 12-24 hours, contact your doctor.  4. If you do not return to the Green Zone in 12-24 hours or you get worse, start taking your oral steroid medicine if prescribed by your provider.           RED ZONE Medical Alert - Get Help  I have ANY of these:    I feel awful    Medicine is not helping    Breathing getting harder    Trouble walking or talking    Nose opens wide to breathe       1. Take your rescue medicine NOW  2. If your provider has prescribed an oral steroid medicine, start taking it NOW  3. Call your doctor NOW  4. If you are still in the Red Zone after 20 minutes and you have not reached your doctor:    Take your rescue medicine again and    Call 911 or go to the emergency room right away    See your regular doctor within 2 weeks  of an Emergency Room or Urgent Care visit for follow-up treatment.          Annual Reminders:  Meet with Asthma Educator,  Flu Shot in the Fall, consider Pneumonia Vaccination for patients with asthma (aged 19 and older).    Pharmacy:    [id number] - [place] - [address] [id number] - [place], [place] - [address]  AT [address]    Electronically signed by Joe Patel PA-C   Date: 01/17/23                    Asthma Triggers  How To Control Things That Make Your Asthma Worse    Triggers are things that make your asthma worse.  Look at the list below to help you find your triggers and   what you can do about them. You can help prevent asthma flare-ups by staying away from your triggers.      Trigger                                                          What you can do   Cigarette Smoke  Tobacco smoke can make asthma worse. Do not allow smoking in your home, car or around you.  Be sure no one smokes at a child s day care or school.  If you smoke, ask your health care provider for ways to help you quit.  Ask family members to quit too.  Ask your health care provider for a referral to Quit Plan to help you quit smoking, or call 1-815-421-PLAN.     Colds, Flu, Bronchitis  These are common triggers of asthma. Wash your hands often.  Don t touch your eyes, nose or mouth.  Get a flu shot every year.     Dust Mites  These are tiny bugs that live in cloth or carpet. They are too small to see. Wash sheets and blankets in hot water every week.   Encase pillows and mattress in dust mite proof covers.  Avoid having carpet if you can. If you have carpet, vacuum weekly.   Use a dust mask and HEPA vacuum.   Pollen and Outdoor Mold  Some people are allergic to trees, grass, or weed pollen, or molds. Try to keep your windows closed.  Limit time out doors when pollen count is high.   Ask you health care provider  about taking medicine during allergy season.     Animal Dander  Some people are allergic to skin flakes, urine or saliva from pets with fur or feathers. Keep pets with fur or feathers out of your home.    If you can t keep the pet outdoors, then keep the pet out of your bedroom.  Keep the bedroom door closed.  Keep pets off cloth furniture and away from stuffed toys.     Mice, Rats, and Cockroaches  Some people are allergic to the waste from these pests.   Cover food and garbage.  Clean up spills and food crumbs.  Store grease in the refrigerator.   Keep food out of the bedroom.   Indoor Mold  This can be a trigger if your home has high moisture. Fix leaking faucets, pipes, or other sources of water.   Clean moldy surfaces.  Dehumidify basement if it is damp and smelly.   Smoke, Strong Odors, and Sprays  These can reduce air quality. Stay away from strong odors and sprays, such as perfume, powder, hair spray, paints, smoke incense, paint, cleaning products, candles and new carpet.   Exercise or Sports  Some people with asthma have this trigger. Be active!  Ask your doctor about taking medicine before sports or exercise to prevent symptoms.    Warm up for 5-10 minutes before and after sports or exercise.     Other Triggers of Asthma  Cold air:  Cover your nose and mouth with a scarf.  Sometimes laughing or crying can be a trigger.  Some medicines and food can trigger asthma.

## 2023-01-17 NOTE — LETTER
My Depression Action Plan  Name: Justin Tolbert   Date of Birth 1990  Date: 1/17/2023    My doctor: Joe Patel   My clinic: [facility]  [id number]  [place] [id number] [phone]          GREEN    ZONE   Good Control    What it looks like:     Things are going generally well. You have normal ups and downs. You may even feel depressed from time to time, but bad moods usually last less than a day.   What you need to do:  1. Continue to care for yourself (see self care plan)  2. Check your depression survival kit and update it as needed  3. Follow your physician s recommendations including any medication.  4. Do not stop taking medication unless you consult with your physician first.           YELLOW         ZONE Getting Worse    What it looks like:     Depression is starting to interfere with your life.     It may be hard to get out of bed; you may be starting to isolate yourself from others.    Symptoms of depression are starting to last most all day and this has happened for several days.     You may have suicidal thoughts but they are not constant.   What you need to do:     1. Call your care team. Your response to treatment will improve if you keep your care team informed of your progress. Yellow periods are signs an adjustment may need to be made.     2. Continue your self-care.  Just get dressed and ready for the day.  Don't give yourself time to talk yourself out of it.    3. Talk to someone in your support network.    4. Open up your Depression Self-Care Plan/Wellness Kit.           RED    ZONE Medical Alert - Get Help    What it looks like:     Depression is seriously interfering with your life.     You may experience these or other symptoms: You can t get out of bed most days, can t work or engage in other necessary activities, you have trouble taking care of basic hygiene, or basic responsibilities, thoughts of suicide or death that will not  go away, self-injurious behavior.     What you need to do:  1. Call your care team and request a same-day appointment. If they are not available (weekends or after hours) call your local crisis line, emergency room or 911.          Depression Self-Care Plan / Wellness Kit    Many people find that medication and therapy are helpful treatments for managing depression. In addition, making small changes to your everyday life can help to boost your mood and improve your wellbeing. Below are some tips for you to consider. Be sure to talk with your medical provider and/or behavioral health consultant if your symptoms are worsening or not improving.     Sleep   Sleep hygiene  means all of the habits that support good, restful sleep. It includes maintaining a consistent bedtime and wake time, using your bedroom only for sleeping or sex, and keeping the bedroom dark and free of distractions like a computer, smartphone, or television.     Develop a Healthy Routine  Maintain good hygiene. Get out of bed in the morning, make your bed, brush your teeth, take a shower, and get dressed. Don t spend too much time viewing media that makes you feel stressed. Find time to relax each day.    Exercise  Get some form of exercise every day. This will help reduce pain and release endorphins, the  feel good  chemicals in your brain. It can be as simple as just going for a walk or doing some gardening, anything that will get you moving.      Diet  Strive to eat healthy foods, including fruits and vegetables. Drink plenty of water. Avoid excessive sugar, caffeine, alcohol, and other mood-altering substances.     Stay Connected with Others  Stay in touch with friends and family members.    Manage Your Mood  Try deep breathing, massage therapy, biofeedback, or meditation. Take part in fun activities when you can. Try to find something to smile about each day.     Psychotherapy  Be open to working with a therapist if your provider recommends it.      Medication  Be sure to take your medication as prescribed. Most anti-depressants need to be taken every day. It usually takes several weeks for medications to work. Not all medicines work for all people. It is important to follow-up with your provider to make sure you have a treatment plan that is working for you. Do not stop your medication abruptly without first discussing it with your provider.    Crisis Resources   These hotlines are for both adults and children. They and are open 24 hours a day, 7 days a week unless noted otherwise.      National Suicide Prevention Lifeline   988 or 9-531-273-NGVA (2370)      Crisis Text Line    www.crisistextline.org  Text HOME to 902143 from anywhere in the [place], anytime, about any type of crisis. A live, trained crisis counselor will receive the text and respond quickly.      [employer] Lifeline for LGBTQ Youth  A national crisis intervention and suicide lifeline for LGBTQ youth under 25. Provides a safe place to talk without judgement. Call 1-605.598.5765; text START to 471671 or visit www.thetrevorproject.org to talk to a trained counselor.      For [facility] crisis numbers, visit the [facility] website at:  https://[place].gov/dhs/people-we-serve/adults/health-care/mental-health/resources/crisis-contacts.jsp

## 2023-02-27 ENCOUNTER — TELEPHONE (OUTPATIENT)
Dept: FAMILY MEDICINE | Facility: CLINIC | Age: 33
End: 2023-02-27

## 2023-02-27 DIAGNOSIS — F90.2 ATTENTION DEFICIT HYPERACTIVITY DISORDER, COMBINED TYPE: ICD-10-CM

## 2023-02-27 NOTE — TELEPHONE ENCOUNTER
Reason for Call:  Other     Detailed comments: Pharmacy would like a script for the Adderall 5 mg due to the 10 mg is on back order. Patient is out of the medication.    Phone Number [facility]     Best Time:     Can we leave a detailed message on this number? Not Applicable    Call taken on 2/27/2023 at 11:30 AM by Elise Watts

## 2023-03-02 RX ORDER — DEXTROAMPHETAMINE SACCHARATE, AMPHETAMINE ASPARTATE, DEXTROAMPHETAMINE SULFATE AND AMPHETAMINE SULFATE 1.25; 1.25; 1.25; 1.25 MG/1; MG/1; MG/1; MG/1
10 TABLET ORAL EVERY MORNING
Qty: 60 TABLET | Refills: 0 | Status: SHIPPED | OUTPATIENT
Start: 2023-03-02 | End: 2023-03-17

## 2023-04-06 DIAGNOSIS — F90.2 ATTENTION DEFICIT HYPERACTIVITY DISORDER, COMBINED TYPE: ICD-10-CM

## 2023-04-06 RX ORDER — DEXTROAMPHETAMINE SACCHARATE, AMPHETAMINE ASPARTATE MONOHYDRATE, DEXTROAMPHETAMINE SULFATE AND AMPHETAMINE SULFATE 5; 5; 5; 5 MG/1; MG/1; MG/1; MG/1
20 CAPSULE, EXTENDED RELEASE ORAL 2 TIMES DAILY
Qty: 60 CAPSULE | Refills: 0 | Status: SHIPPED | OUTPATIENT
Start: 2023-04-06 | End: 2023-04-27

## 2023-04-06 NOTE — TELEPHONE ENCOUNTER
Pharmacy that script was sent to is out of the medication and would like it sent to pharmacy pended.

## 2023-06-04 ENCOUNTER — HEALTH MAINTENANCE LETTER (OUTPATIENT)
Age: 33
End: 2023-06-04

## 2024-04-02 ENCOUNTER — VIRTUAL VISIT (OUTPATIENT)
Dept: FAMILY MEDICINE | Facility: CLINIC | Age: 34
End: 2024-04-02
Payer: COMMERCIAL

## 2024-04-02 DIAGNOSIS — F90.2 ATTENTION DEFICIT HYPERACTIVITY DISORDER, COMBINED TYPE: ICD-10-CM

## 2024-04-02 DIAGNOSIS — R79.89 ELEVATED LFTS: ICD-10-CM

## 2024-04-02 DIAGNOSIS — R45.86 VARIABLE MOOD: Primary | ICD-10-CM

## 2024-04-02 PROCEDURE — 99443 PR PHYSICIAN TELEPHONE EVALUATION 21-30 MIN: CPT | Mod: 93 | Performed by: PHYSICIAN ASSISTANT

## 2024-04-02 RX ORDER — DIVALPROEX SODIUM 500 MG/1
500 TABLET, DELAYED RELEASE ORAL DAILY
Qty: 90 TABLET | Refills: 1 | Status: SHIPPED | OUTPATIENT
Start: 2024-04-02

## 2024-04-02 ASSESSMENT — PATIENT HEALTH QUESTIONNAIRE - PHQ9: SUM OF ALL RESPONSES TO PHQ QUESTIONS 1-9: 0

## 2024-04-02 ASSESSMENT — ASTHMA QUESTIONNAIRES: ACT_TOTALSCORE: 25

## 2024-04-02 NOTE — PROGRESS NOTES
Justin is a 33 year old who is being evaluated via a billable telephone visit.    What phone number would you like to be contacted at? [phone]  How would you like to obtain your AVS? Gina  Originating Location (pt. Location): Home    Distant Location (provider location):  On-site      Subjective   Justin is a 33 year old, presenting for the following health issues:  Recheck Medication  Episode of financial stressors and insomnia. Led to depression and psychosis. Has felt better since his hospital discharge. Still taking the depakote once a day.   No longer taking zyprexa and has been sleeping fine. Mood has improved. Starting counseling.   Abnormal lft's in hospital. No abd pain. Sober x 2 mos. No jaundice. No tylenol abuse.    Ahdh has been stable.   Underlying anxiety and ptsd issues. Will work on this with his counselor.      4/2/2024    11:46 AM   Additional Questions   Roomed by Shannon Pederson CMA   Accompanied by None         4/2/2024    11:46 AM   Patient Reported Additional Medications   Patient reports taking the following new medications none     HPI     Medication recheck          Review of Systems  Constitutional, neuro, ENT, endocrine, pulmonary, cardiac, gastrointestinal, genitourinary, musculoskeletal, integument and psychiatric systems are negative, except as otherwise noted.      Objective           Vitals:  No vitals were obtained today due to virtual visit.    Physical Exam   General: Alert and no distress //Respiratory: No audible wheeze, cough, or shortness of breath // Psychiatric:  Appropriate affect, tone, and pace of words      Justin was seen today for recheck medication.    Diagnoses and all orders for this visit:    Variable mood  -     divalproex sodium delayed-release (DEPAKOTE) 500 MG DR tablet; Take 1 tablet (500 mg) by mouth daily    Attention deficit hyperactivity disorder, combined type    Elevated LFTs  -     Hepatic panel (Albumin, ALT, AST, Bili, Alk Phos, TP); Future  -      US Abdomen Limited; Future    Other orders  -     REVIEW OF HEALTH MAINTENANCE PROTOCOL ORDERS      Advised supportive and symptomatic treatment.  Follow up with Provider - if condition persists or worsens.   work on lifestyle modification  Continue current dose of adderall.         Phone call duration: 24 minutes  Signed Electronically by: Joe Patel PA-C

## 2024-07-20 ENCOUNTER — HEALTH MAINTENANCE LETTER (OUTPATIENT)
Age: 34
End: 2024-07-20

## 2024-10-14 ENCOUNTER — MYC REFILL (OUTPATIENT)
Dept: FAMILY MEDICINE | Facility: CLINIC | Age: 34
End: 2024-10-14
Payer: COMMERCIAL

## 2024-10-14 DIAGNOSIS — F90.2 ATTENTION DEFICIT HYPERACTIVITY DISORDER, COMBINED TYPE: ICD-10-CM

## 2024-10-14 DIAGNOSIS — F41.1 GENERALIZED ANXIETY DISORDER: ICD-10-CM

## 2024-10-17 RX ORDER — DEXTROAMPHETAMINE SACCHARATE, AMPHETAMINE ASPARTATE, DEXTROAMPHETAMINE SULFATE AND AMPHETAMINE SULFATE 1.25; 1.25; 1.25; 1.25 MG/1; MG/1; MG/1; MG/1
10 TABLET ORAL EVERY MORNING
Qty: 60 TABLET | Refills: 0 | Status: ON HOLD | OUTPATIENT
Start: 2024-10-17 | End: 2024-10-25

## 2024-10-17 RX ORDER — ALPRAZOLAM 1 MG/1
1 TABLET ORAL PRN
Qty: 10 TABLET | Refills: 0 | Status: ON HOLD | OUTPATIENT
Start: 2024-10-17 | End: 2024-10-25

## 2024-10-17 RX ORDER — DEXTROAMPHETAMINE SACCHARATE, AMPHETAMINE ASPARTATE MONOHYDRATE, DEXTROAMPHETAMINE SULFATE AND AMPHETAMINE SULFATE 5; 5; 5; 5 MG/1; MG/1; MG/1; MG/1
20 CAPSULE, EXTENDED RELEASE ORAL 2 TIMES DAILY
Qty: 60 CAPSULE | Refills: 0 | Status: ON HOLD | OUTPATIENT
Start: 2024-10-17 | End: 2024-10-25

## 2024-10-17 NOTE — TELEPHONE ENCOUNTER
angie is due for a visit with me. Schedule him for a virtual (video or phone based on patient preference. Always promote a video visit first) visit with me.

## 2024-10-21 ENCOUNTER — HOSPITAL ENCOUNTER (OUTPATIENT)
Facility: CLINIC | Age: 34
Setting detail: OBSERVATION
Discharge: HOME | End: 2024-10-21
Attending: EMERGENCY MEDICINE
Payer: COMMERCIAL

## 2024-10-21 ENCOUNTER — TELEPHONE (OUTPATIENT)
Dept: BEHAVIORAL HEALTH | Facility: CLINIC | Age: 34
End: 2024-10-21

## 2024-10-21 ENCOUNTER — HOSPITAL ENCOUNTER (EMERGENCY)
Facility: CLINIC | Age: 34
Discharge: PSYCHIATRIC OR SUBSTANCE USE FACILITY | End: 2024-10-22
Attending: EMERGENCY MEDICINE | Admitting: EMERGENCY MEDICINE
Payer: COMMERCIAL

## 2024-10-21 VITALS
RESPIRATION RATE: 18 BRPM | BODY MASS INDEX: 30.1 KG/M2 | SYSTOLIC BLOOD PRESSURE: 137 MMHG | WEIGHT: 215 LBS | HEIGHT: 71 IN | TEMPERATURE: 97.9 F | OXYGEN SATURATION: 98 % | HEART RATE: 98 BPM | DIASTOLIC BLOOD PRESSURE: 91 MMHG

## 2024-10-21 DIAGNOSIS — F19.10 SUBSTANCE ABUSE (H): ICD-10-CM

## 2024-10-21 DIAGNOSIS — F31.32 BIPOLAR AFFECTIVE DISORDER, CURRENTLY DEPRESSED, MODERATE (H): ICD-10-CM

## 2024-10-21 DIAGNOSIS — F90.2 ATTENTION DEFICIT HYPERACTIVITY DISORDER, COMBINED TYPE: ICD-10-CM

## 2024-10-21 DIAGNOSIS — F22 PARANOIA (H): ICD-10-CM

## 2024-10-21 DIAGNOSIS — R45.851 SUICIDAL IDEATION: ICD-10-CM

## 2024-10-21 DIAGNOSIS — F19.10 POLYSUBSTANCE ABUSE (H): ICD-10-CM

## 2024-10-21 DIAGNOSIS — G47.09 OTHER INSOMNIA: ICD-10-CM

## 2024-10-21 DIAGNOSIS — R45.1 AGITATION: ICD-10-CM

## 2024-10-21 PROBLEM — F39 UNSPECIFIED MOOD (AFFECTIVE) DISORDER (H): Status: ACTIVE | Noted: 2024-10-21

## 2024-10-21 LAB
ALBUMIN SERPL BCG-MCNC: 4.7 G/DL (ref 3.5–5.2)
ALP SERPL-CCNC: 170 U/L (ref 40–150)
ALT SERPL W P-5'-P-CCNC: 81 U/L (ref 0–70)
AMPHETAMINES UR QL SCN: ABNORMAL
ANION GAP SERPL CALCULATED.3IONS-SCNC: 15 MMOL/L (ref 7–15)
APAP SERPL-MCNC: <5 UG/ML (ref 10–30)
AST SERPL W P-5'-P-CCNC: 91 U/L (ref 0–45)
BARBITURATES UR QL SCN: ABNORMAL
BASOPHILS # BLD AUTO: 0 10E3/UL (ref 0–0.2)
BASOPHILS NFR BLD AUTO: 0 %
BENZODIAZ UR QL SCN: ABNORMAL
BILIRUB SERPL-MCNC: 1.9 MG/DL
BUN SERPL-MCNC: 23 MG/DL (ref 6–20)
BZE UR QL SCN: ABNORMAL
CALCIUM SERPL-MCNC: 10.4 MG/DL (ref 8.8–10.4)
CANNABINOIDS UR QL SCN: ABNORMAL
CHLORIDE SERPL-SCNC: 92 MMOL/L (ref 98–107)
CREAT SERPL-MCNC: 0.71 MG/DL (ref 0.67–1.17)
EGFRCR SERPLBLD CKD-EPI 2021: >90 ML/MIN/1.73M2
EOSINOPHIL # BLD AUTO: 0.2 10E3/UL (ref 0–0.7)
EOSINOPHIL NFR BLD AUTO: 2 %
ERYTHROCYTE [DISTWIDTH] IN BLOOD BY AUTOMATED COUNT: 13 % (ref 10–15)
ETHANOL SERPL-MCNC: <0.01 G/DL
FENTANYL UR QL: ABNORMAL
GLUCOSE SERPL-MCNC: 95 MG/DL (ref 70–99)
HCO3 SERPL-SCNC: 26 MMOL/L (ref 22–29)
HCT VFR BLD AUTO: 42 % (ref 40–53)
HGB BLD-MCNC: 14.6 G/DL (ref 13.3–17.7)
IMM GRANULOCYTES # BLD: 0.1 10E3/UL
IMM GRANULOCYTES NFR BLD: 1 %
LYMPHOCYTES # BLD AUTO: 2.6 10E3/UL (ref 0.8–5.3)
LYMPHOCYTES NFR BLD AUTO: 22 %
MAGNESIUM SERPL-MCNC: 1.9 MG/DL (ref 1.7–2.3)
MCH RBC QN AUTO: 31.9 PG (ref 26.5–33)
MCHC RBC AUTO-ENTMCNC: 34.8 G/DL (ref 31.5–36.5)
MCV RBC AUTO: 92 FL (ref 78–100)
MONOCYTES # BLD AUTO: 1.3 10E3/UL (ref 0–1.3)
MONOCYTES NFR BLD AUTO: 11 %
NEUTROPHILS # BLD AUTO: 7.6 10E3/UL (ref 1.6–8.3)
NEUTROPHILS NFR BLD AUTO: 65 %
NRBC # BLD AUTO: 0 10E3/UL
NRBC BLD AUTO-RTO: 0 /100
OPIATES UR QL SCN: ABNORMAL
PCP QUAL URINE (ROCHE): ABNORMAL
PLATELET # BLD AUTO: 205 10E3/UL (ref 150–450)
POTASSIUM SERPL-SCNC: 3.2 MMOL/L (ref 3.4–5.3)
PROT SERPL-MCNC: 8.5 G/DL (ref 6.4–8.3)
RBC # BLD AUTO: 4.57 10E6/UL (ref 4.4–5.9)
SALICYLATES SERPL-MCNC: <0.3 MG/DL
SODIUM SERPL-SCNC: 133 MMOL/L (ref 135–145)
WBC # BLD AUTO: 11.8 10E3/UL (ref 4–11)

## 2024-10-21 PROCEDURE — 80307 DRUG TEST PRSMV CHEM ANLYZR: CPT | Performed by: EMERGENCY MEDICINE

## 2024-10-21 PROCEDURE — 80143 DRUG ASSAY ACETAMINOPHEN: CPT | Performed by: EMERGENCY MEDICINE

## 2024-10-21 PROCEDURE — 250N000013 HC RX MED GY IP 250 OP 250 PS 637: Performed by: EMERGENCY MEDICINE

## 2024-10-21 PROCEDURE — 80179 DRUG ASSAY SALICYLATE: CPT | Performed by: EMERGENCY MEDICINE

## 2024-10-21 PROCEDURE — 36415 COLL VENOUS BLD VENIPUNCTURE: CPT | Performed by: EMERGENCY MEDICINE

## 2024-10-21 PROCEDURE — 99207 PR NO BILLABLE SERVICE THIS VISIT: CPT | Performed by: NURSE PRACTITIONER

## 2024-10-21 PROCEDURE — 99222 1ST HOSP IP/OBS MODERATE 55: CPT | Performed by: PSYCHIATRY & NEUROLOGY

## 2024-10-21 PROCEDURE — 80053 COMPREHEN METABOLIC PANEL: CPT | Performed by: EMERGENCY MEDICINE

## 2024-10-21 PROCEDURE — G0378 HOSPITAL OBSERVATION PER HR: HCPCS

## 2024-10-21 PROCEDURE — 93005 ELECTROCARDIOGRAM TRACING: CPT

## 2024-10-21 PROCEDURE — 99285 EMERGENCY DEPT VISIT HI MDM: CPT

## 2024-10-21 PROCEDURE — 85025 COMPLETE CBC W/AUTO DIFF WBC: CPT | Performed by: EMERGENCY MEDICINE

## 2024-10-21 PROCEDURE — 83735 ASSAY OF MAGNESIUM: CPT | Performed by: EMERGENCY MEDICINE

## 2024-10-21 PROCEDURE — 82077 ASSAY SPEC XCP UR&BREATH IA: CPT | Performed by: EMERGENCY MEDICINE

## 2024-10-21 RX ORDER — ACETAMINOPHEN 325 MG/1
650 TABLET ORAL EVERY 4 HOURS PRN
Status: DISCONTINUED | OUTPATIENT
Start: 2024-10-21 | End: 2024-10-22 | Stop reason: HOSPADM

## 2024-10-21 RX ORDER — POLYETHYLENE GLYCOL 3350 17 G
2 POWDER IN PACKET (EA) ORAL
Status: DISCONTINUED | OUTPATIENT
Start: 2024-10-21 | End: 2024-10-21 | Stop reason: HOSPADM

## 2024-10-21 RX ORDER — OLANZAPINE 5 MG/1
5 TABLET, ORALLY DISINTEGRATING ORAL EVERY 6 HOURS PRN
Status: DISCONTINUED | OUTPATIENT
Start: 2024-10-21 | End: 2024-10-21 | Stop reason: HOSPADM

## 2024-10-21 RX ORDER — POTASSIUM CHLORIDE 1.5 G/1.58G
20 POWDER, FOR SOLUTION ORAL ONCE
Status: COMPLETED | OUTPATIENT
Start: 2024-10-21 | End: 2024-10-21

## 2024-10-21 RX ORDER — OLANZAPINE 10 MG/1
10 TABLET ORAL AT BEDTIME
Status: DISCONTINUED | OUTPATIENT
Start: 2024-10-21 | End: 2024-10-21 | Stop reason: HOSPADM

## 2024-10-21 RX ORDER — NICOTINE 21 MG/24HR
1 PATCH, TRANSDERMAL 24 HOURS TRANSDERMAL ONCE
Status: DISCONTINUED | OUTPATIENT
Start: 2024-10-21 | End: 2024-10-21 | Stop reason: HOSPADM

## 2024-10-21 RX ORDER — HYDROXYZINE HYDROCHLORIDE 25 MG/1
25 TABLET, FILM COATED ORAL EVERY 4 HOURS PRN
Status: DISCONTINUED | OUTPATIENT
Start: 2024-10-21 | End: 2024-10-21 | Stop reason: HOSPADM

## 2024-10-21 RX ORDER — LORAZEPAM 2 MG/ML
1-2 INJECTION INTRAMUSCULAR EVERY 30 MIN PRN
Status: DISCONTINUED | OUTPATIENT
Start: 2024-10-21 | End: 2024-10-21

## 2024-10-21 RX ORDER — LORAZEPAM 1 MG/1
1-2 TABLET ORAL EVERY 30 MIN PRN
Status: DISCONTINUED | OUTPATIENT
Start: 2024-10-21 | End: 2024-10-21

## 2024-10-21 RX ORDER — OLANZAPINE 10 MG/1
10 TABLET, ORALLY DISINTEGRATING ORAL 2 TIMES DAILY PRN
Status: DISCONTINUED | OUTPATIENT
Start: 2024-10-21 | End: 2024-10-22 | Stop reason: HOSPADM

## 2024-10-21 RX ADMIN — NICOTINE 1 PATCH: 21 PATCH, EXTENDED RELEASE TRANSDERMAL at 02:45

## 2024-10-21 RX ADMIN — NICOTINE POLACRILEX 2 MG: 2 LOZENGE ORAL at 01:17

## 2024-10-21 RX ADMIN — MELATONIN TAB 3 MG 3 MG: 3 TAB at 04:14

## 2024-10-21 RX ADMIN — POTASSIUM CHLORIDE 20 MEQ: 1.5 POWDER, FOR SOLUTION ORAL at 02:45

## 2024-10-21 RX ADMIN — HYDROXYZINE HYDROCHLORIDE 25 MG: 25 TABLET, FILM COATED ORAL at 03:30

## 2024-10-21 RX ADMIN — HYDROXYZINE HYDROCHLORIDE 25 MG: 25 TABLET, FILM COATED ORAL at 14:48

## 2024-10-21 ASSESSMENT — LIFESTYLE VARIABLES
NAUSEA AND VOMITING: NO NAUSEA AND NO VOMITING
AGITATION: NORMAL ACTIVITY
HEADACHE, FULLNESS IN HEAD: NOT PRESENT
PAROXYSMAL SWEATS: NO SWEAT VISIBLE
AGITATION: NORMAL ACTIVITY
ORIENTATION AND CLOUDING OF SENSORIUM: ORIENTED AND CAN DO SERIAL ADDITIONS
HEADACHE, FULLNESS IN HEAD: NOT PRESENT
ORIENTATION AND CLOUDING OF SENSORIUM: ORIENTED AND CAN DO SERIAL ADDITIONS
HEADACHE, FULLNESS IN HEAD: NOT PRESENT
PAROXYSMAL SWEATS: NO SWEAT VISIBLE
ORIENTATION AND CLOUDING OF SENSORIUM: ORIENTED AND CAN DO SERIAL ADDITIONS
VISUAL DISTURBANCES: NOT PRESENT
AUDITORY DISTURBANCES: NOT PRESENT
VISUAL DISTURBANCES: NOT PRESENT
TOTAL SCORE: 1
VISUAL DISTURBANCES: NOT PRESENT
TREMOR: NO TREMOR
HEADACHE, FULLNESS IN HEAD: NOT PRESENT
ANXIETY: NO ANXIETY, AT EASE
TOTAL SCORE: 1
TOTAL SCORE: 3
PAROXYSMAL SWEATS: NO SWEAT VISIBLE
ANXIETY: MILDLY ANXIOUS
AUDITORY DISTURBANCES: VERY MILD HARSHNESS OR ABILITY TO FRIGHTEN
NAUSEA AND VOMITING: NO NAUSEA AND NO VOMITING
ORIENTATION AND CLOUDING OF SENSORIUM: ORIENTED AND CAN DO SERIAL ADDITIONS
TREMOR: NO TREMOR
NAUSEA AND VOMITING: NO NAUSEA AND NO VOMITING
TOTAL SCORE: 1
AUDITORY DISTURBANCES: NOT PRESENT
TREMOR: NO TREMOR
AGITATION: SOMEWHAT MORE THAN NORMAL ACTIVITY
TREMOR: NO TREMOR
AGITATION: SOMEWHAT MORE THAN NORMAL ACTIVITY
VISUAL DISTURBANCES: NOT PRESENT
ANXIETY: MILDLY ANXIOUS
NAUSEA AND VOMITING: NO NAUSEA AND NO VOMITING
AUDITORY DISTURBANCES: NOT PRESENT
ANXIETY: MILDLY ANXIOUS
PAROXYSMAL SWEATS: NO SWEAT VISIBLE

## 2024-10-21 ASSESSMENT — COLUMBIA-SUICIDE SEVERITY RATING SCALE - C-SSRS
IS THE PATIENT NOT ABLE TO COMPLETE C-SSRS: REFUSES TO ANSWER
1. IN THE PAST MONTH, HAVE YOU WISHED YOU WERE DEAD OR WISHED YOU COULD GO TO SLEEP AND NOT WAKE UP?: NO
2. HAVE YOU ACTUALLY HAD ANY THOUGHTS OF KILLING YOURSELF IN THE PAST MONTH?: NO
6. HAVE YOU EVER DONE ANYTHING, STARTED TO DO ANYTHING, OR PREPARED TO DO ANYTHING TO END YOUR LIFE?: YES
IS THE PATIENT NOT ABLE TO COMPLETE C-SSRS: UNABLE TO VERBALIZE

## 2024-10-21 NOTE — PROGRESS NOTES
BRIEF HOUSE NP NOTE:     Code 21 called to [facility]. Upon arrival, I found patient outside with staff x2 from [facility] and security officers. Patient extremely diaphoretic, grabbing at his stomach, appeared very anxious and paranoid. Patient reports that he needs help due to ingesting unknown pills he purchased on the street. He is unable to tell me when he took these pills. Per [facility] staff, patient was not hold able and had therefore been discharged from their care, when he began running around the [facility] prompting the Code 21 activation. Patient easily redirectable. When asked if he wanted to go to the ED he was in agreement. Patient willingly walked to the ED with myself, flying burgos RN and security.     Patient discussed with Dr. Brian of the ED.     TANNA [facility]  Securely message with the Vocera Web Console (learn more here)  Text page via [employer] Paging/Directory      No charge note.

## 2024-10-21 NOTE — ED NOTES
[facility]  ED to EMPATH Checklist:      Goal for EMPATH: Depression management, Substance use, Suicidality,  Referral and resources, and Time to stabilize    Current Behavior: Calm and Cooperative    Safety Concerns: Suicidal, no plan    Legal Hold Status: [facility]    Medically Cleared by ED provider: Yes    Patient Therapeutically Searched: Therapeutic search by ED staff (strings, belts, shoes, pockets, electronics, etc.)    Belongings: In room locker    Independent Ambulation at Baseline: Yes/No: Yes    Participates in Care/Conversation: Yes/No: Yes    Patient Informed about EMPATH: Yes/No: Yes    DEC: Ordered and completed    Patient Ready to be Transferred to EMPATH? Yes/No: Yes

## 2024-10-21 NOTE — ED NOTES
Seen by [facility]. CIWA scale is 1. Denies physical pain at present. Continues to seem preoccupied when asking questions.

## 2024-10-21 NOTE — ED PROVIDER NOTES
"  Emergency Department Note      History of Present Illness     Chief Complaint   Mental Health Problem    HPI   Freddy Tolbert is a 33 year old male with a history including bipolar affective disorder, substance abuse, social anxiety, depressive disorder, ADHD, and suicidal ideation who presents to the ED for psychiatric evaluation. The patient went to the ED this morning for difficulties sleeping and depression. He noted taking several different drugs prior to arrival. He was medically cleared and sent to [facility]. At [facility] he became agitated and left against his treatment teams recommendation. Then, he was found anxious, diaphoretic, and paranoid in the [facility] lobby. Alongside this, his last drink was 3 days ago.     History limited due to patient's not answering questions.     Independent Historian   None    Review of External Notes   I reviewed patient's ED note from today for substance abuse and ADHD.    Past Medical History     Medical History and Problem List   Arthritis  Depressive disorder  Uncomplicated asthma  Bipolar affective disorder  Substance abuse  Social anxiety  ADHD  Suicidal ideation    Medications   Albuterol  Alprazolam  Amphetamine-dextroamphetamine  Divalproex  Propranolol    Surgical History   Club foot orthopedic surgery    Physical Exam     Patient Vitals for the past 24 hrs:   BP Temp Temp src Pulse Resp SpO2 Height Weight   10/21/24 1927 (!) 132/91 98.8  F (37.1  C) Temporal 105 16 99 % -- --   10/21/24 1555 (!) 143/101 98.9  F (37.2  C) Temporal 116 18 98 % 1.803 m (5' 11\") 93 kg (205 lb)     Physical Exam  General: Sitting on the ED bed  HEENT: Normocephalic, atraumatic  Cardiac: Warm and well perfused, regular tachycardia  Pulm: Breathing comfortably, no accessory muscle usage, no conversational dyspnea, and lungs clear bilaterally  MSK: No bony deformities  Skin: Warm and dry  Neuro: Moves all extremities  Psych: Flat mood and affect    Diagnostics       Independent " Interpretation   None    ED Course      Medications Administered   Medications - No data to display    Procedures   Procedures     Discussion of Management   None    ED Course   ED Course as of 10/21/24 2044   Mon Oct 21, 2024   1610 I obtained history and examined the patient as noted above.      Additional Documentation  None    Medical Decision Making / Diagnosis     CMS Diagnoses: None    MIPS       None    MDM   33-year-old male presents with agitation after being discharged from [facility].  He was reportedly pacing in the [facility] lab the and appeared to be quite paranoid.  He is largely nonverbal on my initial evaluation, which severely limits the history.  He is tachycardic.  Multiple substances noted in the history when the patient presented to the ED very early this morning including alcohol, marijuana, Adderall, fentanyl, tryptamines.  There is also daily alcohol use noted in that encounter with shakiness when he stops.  Psychiatry note from today points out a history of bipolar disorder.  Reported insomnia that improved with hydroxyzine.  No noted signs of hallucinations on psychiatry evaluation.  There was also apparently concern for paranoia.  He subsequently demanded to be discharged from [facility] and since there was no immediate safety concern staff proceeded with discharge.  This was, however, against recommendation from the psychiatry team.  Given the history above, 1 initial concern is for the possibility of alcohol withdrawal with delirium tremens.  CIWA score is 1, so low suspicion for alcohol withdrawal.  Overall, suspect lasting effects of substances ingested prior to initial presentation earlier this morning as well as a possibility of contributing underlying psychiatric illness.  DEC was consulted recommends inpatient mental health stabilization.  No clear risk of harm to self or others, so no grounds for a hold at this time.  The patient is currently awaiting mental health bed placement, signed  out to my colleague pending the same.  Disposition   Care of the patient was transferred to my colleague Dr. Will pending inpatient mental health placement.     Diagnosis     ICD-10-CM    1. Polysubstance abuse (H)  F19.10       2. Agitation  R45.1       3. Paranoia (H)  F22              Scribe Disclosure:  I, Abraham Whitehead, am serving as a scribe at 4:33 PM on 10/21/2024 to document services personally performed by Zacarias Brian MD, based on my observations and the provider's statements to me.        Zacarias Brian MD  10/21/24 [id number]

## 2024-10-21 NOTE — ED NOTES
33 year old male with history of arthritis, depression, asthma, polysubstance abuse, bipolar disorder, ADHD, seizures and mood disorder received from ED due to suicidal ideation.  ED reports patient suicidal, having difficulty sleeping, and using alcohol, MJ, adderall, fentanyl, and tryptramines.  Patient states he has not used substances in six days and ED MD reports patient stated he used in the past 24 hours.  Patient disorganized with most answers yet alert to person, place, day of week and able to perform own ADLs.  States has a suicidal plan and has gathered supplies to carry it out but is unable elaborate and gives disorganized answers to follow up questions.  States he is suicidal due to inability to sleep and also hears intermittent AH describing them as random sentences.  Patient is unclear with his current medication list and describes tapering his adderall dose recently.  Reports he uses a [facility] in [facility] for his pharmacy.  Noted to walk with a limp upon arriving on the unit and patient states he has an injury to his foot and a walker was found for patient to use while on the unit.  Denies HI. Nursing and risk assessments completed. Assessments reviewed with [facility] and physician. Admission information reviewed with patient. Patient given a tour of [facility] and instructions on using the facility. Questions regarding [facility] addressed. Pt safety search completed.

## 2024-10-21 NOTE — ED NOTES
Patient awake and eating breakfast at the table. CWI score of 1. Uses his walker due to  ambulation  unsteady from  arthritis. Continue to monitor safety closely.

## 2024-10-21 NOTE — CONSULTS
"Diagnostic Evaluation Consultation  Crisis Assessment    Patient Name: Freddy Tolbert  Age:  33 year old  Legal Sex: male  Gender Identity: male  Pronouns:   Race: White  Ethnicity: Not  or   Language: English      Patient was assessed: In person   Crisis Assessment Start Date: 10/21/24  Crisis Assessment Start Time: 0819  Crisis Assessment Stop Time: 0830  Patient location: [facility] EMERGENCY DEPT                             EMP11    Referral Data and Chief Complaint  Freddy Tolbert presents to the ED with family/friends (Brother brought Pt in). Patient is presenting to the ED for the following concerns: Suicidal ideation, Worsening psychosocial stress, Substance use, Depression.   Factors that make the mental health crisis life threatening or complex are:  Pt presents to the ED via his brother for mental health concerns. When Writer attempted to meet with Pt, Pt was guarded and would respond with one worded answers. Pt questioned what information is confidential and voiced concern that he may need a . When Writer discussed limits of confidentiality and protected health information, Pt did not present as understanding the information, such as continuing to not respond to Writer's questions and state \"lets not talk about that.\" Pt did state that his house was recently tested and substances that are consistent with producing meth was found in his house and believes he may be taking the fall for it from a legal standpoint and believes the [place] cartel may be behind this. When prompted by Writer, Pt denied experiencing any SI, HI, AH/VH. Pt did not identify any substance use. Information for the assessment will be completed by chart review and collateral information.      Informed Consent and Assessment Methods  Explained the crisis assessment process, including applicable information disclosures and limits to confidentiality, assessed understanding of the process, and " obtained consent to proceed with the assessment.  Assessment methods included conducting a formal interview with patient, review of medical records, collaboration with medical staff, and obtaining relevant collateral information from family and community providers when available.  : done     Patient response to interventions: needs reinforcement  Coping skills were attempted to reduce the crisis:  Voluntarily presented to the ED     History of the Crisis   Per chart review, Pt presented to the ED via his brother with concern of suicidal ideation, sleep disturbance, and substance use. It is reported Pt has endorsed suicidal ideation with a plan, yet could not provide specific or coherent thoughts about this topic. It is also reported Pt has given conflicting information regarding substance use and chart review indicates Pt has used alcohol, marijuana, fentanyl, adderall, and tryptamines.    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 Pt denied during assessment yet chart review indicates Pt has endorsed since being at the ED.)  Current Somatic Symptoms:  anxious  Current Psychosis/Thought Disturbance:  auditory hallucinations (Conflicting reports of Pt experiencing and endorsing AH, Pt denies during assessment)  Current  Eating Symptoms:     Chemical Use History:  Alcohol:  (Unable to assess amount of alcohol use.)  Benzodiazepines: None (Unable to assess amount of use.)  Opiates:  (Unable to assess amount of fentanyl use.)  Marijuana:  (Unable to assess amount of MJ use.)  Other Use:  (Adderall, tryptamines)  Withdrawal Symptoms: Tremors   Past diagnosis:  ADHD, Bipolar Disorder, Depression, Substance Use Disorder  Family history:  No known history of mental health or chemical health concerns  Past treatment:  Inpatient Hospitalization, Psychiatric Medication Management  Details of most recent treatment:  Chart review would indicate that Pt is not currently working with any outpatient providers.  Other relevant history:  Chart review indicates history of Pt utilizing psychiatry and voluntary inpatient hospitalization.       Collateral Information  Is there collateral information: No (No emergency contact listed, utilized chart review.)     Risk Assessment  B[place]ster Suicide Severity Rating Scale Full Clinical Version:  Suicidal Ideation  Q6 Suicide Behavior (Lifetime): yes          B[place]ster Suicide Severity Rating Scale Recent:   Suicidal Ideation (Recent)  Q1 Wished to be Dead (Past Month): yes  Q2 Suicidal Thoughts (Past Month): yes  Q3 Suicidal Thought Method: yes  Q4 Suicidal Intent without Specific Plan: no  Q5 Suicide Intent with Specific Plan: yes  If yes to Q6, within past 3 months?:  (Disorganized answer.)  Level of Risk per Screen: high risk          Environmental or Psychosocial Events: legal issues such as DWI, DUI, lawsuit, CPS involvement, etc., ongoing abuse of substances, excessive debt, poor finances  Protective Factors: Protective Factors: help seeking, lives in a responsibly safe and stable environment    Does the patient have thoughts of harming others? Feels Like Hurting Others: no  Previous Attempt to Hurt Others: no  Is the patient engaging in sexually inappropriate behavior?: no    Is the patient engaging in  sexually inappropriate behavior?  no        Mental Status Exam   Affect: Constricted  Appearance: Appropriate  Attention Span/Concentration: Other (please comment) (varying)  Eye Contact: Variable    Fund of Knowledge:     Language /Speech Content: Fluent  Language /Speech Volume: Normal  Language /Speech Rate/Productions: Minimally Responsive  Recent Memory: Variable  Remote Memory: Variable  Mood: Anxious  Orientation to Person: Yes   Orientation to Place: Yes  Orientation to Time of Day: Answer (please comment) (Unable to assess given Pt's presentation)  Orientation to Date: Answer (please comment) (Unable to assess given Pt's presentation)     Situation (Do they understand why they are here?): Answer (please comment) (Unable to assess given Pt's presentation)  Psychomotor Behavior: Normal  Thought Content: Paranoia, Clear, Other (please comment) (Pt is able to engage with Writer, yet voiced concern of interaction being used against him)  Thought Form: Paranoia, Intact       Medication  Psychotropic medications:   Medication Orders - Psychiatric (From admission, onward)      Start     Dose/Rate Route Frequency Ordered Stop    10/21/24 2200  OLANZapine (zyPREXA) tablet 10 mg         10 mg Oral AT BEDTIME 10/21/24 1110      10/21/24 0233  hydrOXYzine HCl (ATARAX) tablet 25 mg         25 mg Oral EVERY 4 HOURS PRN 10/21/24 0233      10/21/24 0130  nicotine (NICODERM CQ) 21 MG/24HR 24 hr patch 1 patch         1 patch  over 24 Hours Transdermal ONCE 10/21/24 0111      10/21/24 0110  nicotine (COMMIT) lozenge 2 mg         2 mg Buccal EVERY 1 HOUR PRN 10/21/24 0111               Current Care Team  Patient Care Team:  Joe Patel PA-C as PCP - General (Family Medicine)  Joe Patel PA-C as Assigned PCP    Diagnosis  Patient Active Problem List   Diagnosis Code    Attention deficit hyperactivity disorder, combined type F90.2    Anxiety state F41.1    History of vitamin D deficiency Z86.39    Suicidal ideation  "R45.851    Substance abuse (H) F19.10    Bipolar affective disorder, currently depressed, moderate (H) F31.32    Other insomnia G47.09       Primary Problem This Admission  Active Hospital Problems    Suicidal ideation      Substance abuse (H)      Bipolar affective disorder, currently depressed, moderate (H)      Other insomnia      Attention deficit hyperactivity disorder, combined type        Clinical Summary and Substantiation of Recommendations   Pt presents to the ED via his brother for mental health concerns. When Writer attempted to meet with Pt, Pt was guarded and would respond with one worded answers. Pt questioned what information is confidential and voiced concern that he may need a . When Writer discussed limits of confidentiality and protected health information, Pt did not present as understanding the information, such as continuing to not respond to Writer's questions and state \"lets not talk about that.\" Pt did state that his house was recently tested and substances that are consistent with producing meth was found in his house and believes he may be taking the fall for it from a legal standpoint and believes the [place] cartel may be behind this. When prompted by Writer, Pt denied experiencing any SI, HI, AH/VH. Pt did not identify any substance use. Information for the assessment will be completed by chart review and collateral information.    Per chart review, Pt presented to the ED via his brother with concern of suicidal ideation, sleep disturbance, and substance use. It is reported Pt has endorsed suicidal ideation with a plan, yet could not provide specific or coherent thoughts about this topic. It is also reported Pt has given conflicting information regarding substance use and chart review indicates Pt has used alcohol, marijuana, fentanyl, adderall, and tryptamines.    At this time it does appear that Pt would benefit from further observation and psychiatric stabilization via " medication management and ED level therapeutic interventions, due to Pt's guarded affect, lack of participation, and displays symptoms such as paranoia. Pt was not able to fully safety plan with Writer. Pt does appear to be at higher risk of death by suicide accidental or intentional due to mental health history and substance use history. If Pt is able to effectively safety plan and/or Pts sx improve it would be beneficial to pursue a less restrictive alternative.    Recommendation of Pt to be reassessed on 10/22/2024 to assess severity of paranoia, ability to get restful sleep, and discuss outpatient resources that would be beneficial for Pt.                          Patient coping skills attempted to reduce the crisis:  Voluntarily presented to the ED    Disposition  Recommended disposition: Observation        Reviewed case and recommendations with attending provider. Attending Name: Dr. Munson       Attending concurs with disposition: yes       Patient and/or validated legal guardian concurs with disposition:   yes       Final disposition:  observation    Legal status on admission: Voluntary/Patient has signed consent for treatment    Assessment Details   Total duration spent with the patient: 11 min     CPT code(s) utilized: Non-Billable    ANTONIO Javier, Psychotherapist  [facility] - Triage & Transition Services  Callback: [phone]

## 2024-10-21 NOTE — ED PROVIDER NOTES
Emergency Department Note      History of Present Illness     Chief Complaint   Altered Mental Status and Psychiatric Evaluation      HPI   Freddy Tolbert is a 33 year old male with history of polysubstance abuse who presents to the ED as he has had difficulty sleeping.  He feels increasingly depressed and has suicidal thoughts.  He says he has not made any specific attempt to hurt himself and does not elaborate on specifically what thoughts he is had.  In last 24 hours he says he uses alcohol, marijuana, Adderall, fentanyl, and tryptamines.  Uses alcohol every day and says at times he does become shaky if he stops.  He says he really is not slept well in the last 7 days.  He denies chest pain.  He occasionally vomits after heavy alcohol use.  He denies any headache.  No fever or congestion.    Review of External Notes   [facility] notes and discharge summary reviewed from March 15, 2024 when the patient was admitted for bipolar disorder, alcohol and marijuana use disorders, depression    Past Medical History     Medical History and Problem List   Past Medical History:   Diagnosis Date    Arthritis 12/2021    Depressive disorder 01/2014    Uncomplicated asthma Highschool ~14 yrs ago       Medications   albuterol (PROAIR HFA/PROVENTIL HFA/VENTOLIN HFA) 108 (90 Base) MCG/ACT inhaler  ALPRAZolam (XANAX) 1 MG tablet  amphetamine-dextroamphetamine (ADDERALL XR) 20 MG 24 hr capsule  amphetamine-dextroamphetamine (ADDERALL) 5 MG tablet  divalproex sodium delayed-release (DEPAKOTE) 500 MG DR tablet  propranolol (INDERAL) 10 MG tablet        Surgical History   Past Surgical History:   Procedure Laterality Date    ORTHOPEDIC SURGERY  6 months old    Club foot       Physical Exam     Patient Vitals for the past 24 hrs:   BP Temp Pulse Resp SpO2   10/21/24 0249 (!) 148/100 -- 101 -- 98 %   10/21/24 0014 (!) 167/97 98.2  F (36.8  C) 101 22 99 %     Physical Exam  Constitutional:       Comments: Distractible and loses track of  the conversation   HENT:      Head: Atraumatic.   Eyes:      General: No scleral icterus.     Conjunctiva/sclera: Conjunctivae normal.   Cardiovascular:      Rate and Rhythm: Normal rate and regular rhythm.      Heart sounds: Normal heart sounds.   Pulmonary:      Effort: Pulmonary effort is normal. No respiratory distress.      Breath sounds: Normal breath sounds.   Abdominal:      Palpations: Abdomen is soft.      Tenderness: There is no abdominal tenderness.   Musculoskeletal:         General: No deformity.      Cervical back: Neck supple.      Right lower leg: No edema.      Left lower leg: No edema.   Skin:     General: Skin is warm.      Capillary Refill: Capillary refill takes less than 2 seconds.   Neurological:      General: No focal deficit present.      Mental Status: He is alert and oriented to person, place, and time.   Psychiatric:      Comments: Cooperative           Diagnostics     Lab Results   Labs Ordered and Resulted from Time of ED Arrival to Time of ED Departure   COMPREHENSIVE METABOLIC PANEL - Abnormal       Result Value    Sodium 133 (*)     Potassium 3.2 (*)     Carbon Dioxide (CO2) 26      Anion Gap 15      Urea Nitrogen 23.0 (*)     Creatinine 0.71      GFR Estimate >90      Calcium 10.4      Chloride 92 (*)     Glucose 95      Alkaline Phosphatase 170 (*)     AST 91 (*)     ALT 81 (*)     Protein Total 8.5 (*)     Albumin 4.7      Bilirubin Total 1.9 (*)    ACETAMINOPHEN LEVEL - Abnormal    Acetaminophen <5.0 (*)    CBC WITH PLATELETS AND DIFFERENTIAL - Abnormal    WBC Count 11.8 (*)     RBC Count 4.57      Hemoglobin 14.6      Hematocrit 42.0      MCV 92      MCH 31.9      MCHC 34.8      RDW 13.0      Platelet Count 205      % Neutrophils 65      % Lymphocytes 22      % Monocytes 11      % Eosinophils 2      % Basophils 0      % Immature Granulocytes 1      NRBCs per 100 WBC 0      Absolute Neutrophils 7.6      Absolute Lymphocytes 2.6      Absolute Monocytes 1.3      Absolute  Eosinophils 0.2      Absolute Basophils 0.0      Absolute Immature Granulocytes 0.1      Absolute NRBCs 0.0     URINE DRUG SCREEN PANEL - Abnormal    Amphetamines Urine Screen Negative      Barbituates Urine Screen Negative      Benzodiazepine Urine Screen Negative      Cannabinoids Urine Screen Positive (*)     Cocaine Urine Screen Negative      Fentanyl Qual Urine Screen Negative      Opiates Urine Screen Negative      PCP Urine Screen Negative     ETHYL ALCOHOL LEVEL - Normal    Alcohol ethyl <0.01     SALICYLATE LEVEL - Normal    Salicylate <0.3     MAGNESIUM - Normal    Magnesium 1.9           EKG   ECG results from 10/21/24   EKG 12-lead, tracing only     Value    Systolic Blood Pressure     Diastolic Blood Pressure     Ventricular Rate 106    Atrial Rate 106    WV Interval 148    QRS Duration 86        QTc 454    P Axis 55    R AXIS 68    T Axis 25    Interpretation ECG      Sinus tachycardia  Otherwise normal ECG  No previous ECGs available        ED Course      Medications Administered   Medications   nicotine (COMMIT) lozenge 2 mg (2 mg Buccal $Given 10/21/24 0117)   nicotine (NICODERM CQ) 21 MG/24HR 24 hr patch 1 patch (1 patch Transdermal $Patch/Med Applied 10/21/24 0245)   hydrOXYzine HCl (ATARAX) tablet 25 mg (has no administration in time range)   melatonin tablet 3 mg (has no administration in time range)   potassium chloride (KLOR-CON) Packet 20 mEq (20 mEq Oral $Given 10/21/24 0245)       Medical Decision Making / Diagnosis     TARSHA Tolbert is a 33 year old male who presents to the ED with depression and suicidal thoughts in the context of ongoing substance abuse.  He says he is frustrated by his substance abuse and wants to stop.  He has been having increasingly intrusive suicidal thoughts although at this point he says that he does not feel that he would act on them.    Patient is overall medically clear.  Liver function test slightly elevated consistent with his stated history  of regular alcohol use.  He does appear to be slightly intoxicated today and did admit to using marijuana, alcohol, Adderall, fentanyl, and possibly some other intoxicants earlier in the day.    Given his overall stability he is appropriate for empath.  As needed anxiety and sleep medications were ordered.    Disposition   The patient was transferred to [facility].     Diagnosis     ICD-10-CM    1. Depression with suicidal ideation  F32.A     R45.851       2. Substance abuse (H)  F19.10               Jason Aquino MD  10/21/24 0255

## 2024-10-21 NOTE — ED NOTES
Bed: [facility]  Expected date:   Expected time:   Means of arrival:   Comments:  Hold per charge
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
## 2024-10-21 NOTE — ED TRIAGE NOTES
Pt noted to be diaphoretic on arrival. Pt states he is concerned about a pill he took. Initially said a pill in the hospital. When trying to clarify pt appears to think about the question but doesn't answer. Pt states he last drank ETOH 2-3 days ago.

## 2024-10-21 NOTE — ED TRIAGE NOTES
[facility], sweating. Requested help. Hx of paranoia. Pt stated he took a handful of medications in the hospital, unclear as to which meds.

## 2024-10-21 NOTE — ED PROVIDER NOTES
EmPATH Unit - Initial Psychiatric Observation Note  [facility] Emergency Department  Observation Initiation Date: Oct 21, 2024    Freddy Toblert MRN: [id number]   Age: 33 year old YOB: 1990     History     Chief Complaint   Patient presents with    Altered Mental Status    Psychiatric Evaluation     HPI  Freddy Tolbert is a 33 year old male with a past history notable for a possible bipolar disorder further complicated by a history of polysubstance abuse.  Documentation indicates that he presented to the emergency department reporting depressed mood and suicidal thoughts.  He had also reported recent usage of serotonergic hallucinogens referred to as tryptamines, alcohol, marijuana, stimulants, and Fentanyl.  He was determined to be medically stable and transferred to the EmPATH unit for psychiatric assessment.  He is now approaching 11 hours in the emergency department.  Overnight, there were no acute issues.  On additional record review, electronic records note a diagnosis of bipolar disorder with past medication treatments that have included Depakote and Zyprexa.  On examination, the patient was out in the milieu, sitting at a table and working on a [employer] puzzle.  On initial greetings, he appeared guarded and hesitant to accompany me to the interview room however he eventually did so.  He brought a walker with him in 1 hand however did not rely on it for ambulating as he held a drink in his other hand.  He explains that the initial symptom concerns which prompted his arrival were related to several nights of insomnia.  He did not elaborate on what could be contributing to his insomnia however reports sleeping much better after taking a dose of hydroxyzine yesterday evening.  He confirms ongoing depressive symptoms however does not feel that symptom intensity warrants initiation of an antidepressant medication or mood stabilizing medication.  Other than the medication to take on occasion for  bouts of insomnia, he is not seeking medication management today.  As we reviewed past medication trials, he recalls that Depakote led to blunting of his affect, leading to an undesired response and eventual discontinuation.  He has not been taking any prescribed medications for some time now.  He is not seeking to restart mood stabilizing medications today.  He denied suicidal thoughts today.  There was no indication of auditory or visual hallucinations although he appeared guarded and offered minimal elaboration.  He requested to stay another night on the unit to ensure that he is able to sleep well before returning home.    Past Medical History  Past Medical History:   Diagnosis Date    Arthritis 12/2021    Right ankle on club foot    Depressive disorder 01/2014    Uncomplicated asthma Highschool ~14 yrs ago    exercise enduced     Past Surgical History:   Procedure Laterality Date    ORTHOPEDIC SURGERY  6 months old    Club foot     albuterol (PROAIR HFA/PROVENTIL HFA/VENTOLIN HFA) 108 (90 Base) MCG/ACT inhaler  ALPRAZolam (XANAX) 1 MG tablet  amphetamine-dextroamphetamine (ADDERALL XR) 20 MG 24 hr capsule  amphetamine-dextroamphetamine (ADDERALL) 5 MG tablet  divalproex sodium delayed-release (DEPAKOTE) 500 MG DR tablet  propranolol (INDERAL) 10 MG tablet      No Known Allergies  Family History  Family History   Problem Relation Age of Onset    Asthma Father     Diabetes Paternal Grandmother      Social History   Social History     Tobacco Use    Smoking status: Former     Current packs/day: 0.00     Average packs/day: 1 pack/day for 10.0 years (10.0 ttl pk-yrs)     Types: Cigarettes, Cigars     Start date: 9/1/2013     Quit date: 8/1/2021     Years since quitting: 3.2    Smokeless tobacco: Former     Quit date: 1/1/2017    Tobacco comments:     I just quit one day and figured it out.   Vaping Use    Vaping status: Never Used   Substance Use Topics    Alcohol use: Never    Drug use: Never          Review of  "Systems  A medically appropriate review of systems was performed with pertinent positives and negatives noted in the HPI, and all other systems negative.    Physical Examination   BP: (!) 167/97  Pulse: 101  Temp: 98.2  F (36.8  C)  Resp: 22  Height: 180.3 cm (5' 11\")  Weight: 97.5 kg (215 lb)  SpO2: 99 %    Physical Exam  General: Appears stated age.   Neuro: Alert and fully oriented. Extremities appear to demonstrate normal strength on visual inspection.   Integumentary/Skin: no rash visualized, normal color    Psychiatric Examination   Appearance: awake, alert  Attitude:  cooperative and guarded  Eye Contact:  fair  Mood:  depressed  Affect:  intensity is blunted  Speech:  increased speech latency and decreased prosody  Psychomotor Behavior:  no evidence of tardive dyskinesia, dystonia, or tics  Thought Process:  linear  Associations:  no loose associations  Thought Content:  no evidence of suicidal ideation or homicidal ideation, no auditory hallucinations present, no visual hallucinations present, and guarded and appears paranoid  Insight:  partial  Judgement:  fair  Oriented to:  time, person, and place  Attention Span and Concentration:  fair  Recent and Remote Memory:  fair  Language: able to name/identify objects without impairment  Fund of Knowledge: intact with awareness of current and past events    ED Course        Labs Ordered and Resulted from Time of ED Arrival to Time of ED Departure   COMPREHENSIVE METABOLIC PANEL - Abnormal       Result Value    Sodium 133 (*)     Potassium 3.2 (*)     Carbon Dioxide (CO2) 26      Anion Gap 15      Urea Nitrogen 23.0 (*)     Creatinine 0.71      GFR Estimate >90      Calcium 10.4      Chloride 92 (*)     Glucose 95      Alkaline Phosphatase 170 (*)     AST 91 (*)     ALT 81 (*)     Protein Total 8.5 (*)     Albumin 4.7      Bilirubin Total 1.9 (*)    ACETAMINOPHEN LEVEL - Abnormal    Acetaminophen <5.0 (*)    CBC WITH PLATELETS AND DIFFERENTIAL - Abnormal    WBC " Count 11.8 (*)     RBC Count 4.57      Hemoglobin 14.6      Hematocrit 42.0      MCV 92      MCH 31.9      MCHC 34.8      RDW 13.0      Platelet Count 205      % Neutrophils 65      % Lymphocytes 22      % Monocytes 11      % Eosinophils 2      % Basophils 0      % Immature Granulocytes 1      NRBCs per 100 WBC 0      Absolute Neutrophils 7.6      Absolute Lymphocytes 2.6      Absolute Monocytes 1.3      Absolute Eosinophils 0.2      Absolute Basophils 0.0      Absolute Immature Granulocytes 0.1      Absolute NRBCs 0.0     URINE DRUG SCREEN PANEL - Abnormal    Amphetamines Urine Screen Negative      Barbituates Urine Screen Negative      Benzodiazepine Urine Screen Negative      Cannabinoids Urine Screen Positive (*)     Cocaine Urine Screen Negative      Fentanyl Qual Urine Screen Negative      Opiates Urine Screen Negative      PCP Urine Screen Negative     ETHYL ALCOHOL LEVEL - Normal    Alcohol ethyl <0.01     SALICYLATE LEVEL - Normal    Salicylate <0.3     MAGNESIUM - Normal    Magnesium 1.9         Assessments & Plan (with Medical Decision Making)   Patient presenting with concern for insomnia and paranoia in the context of nonadherence with psychotropic medications and reported substance use.  Documentation indicates a history of being treated with a combination of Zyprexa and Depakote however the patient is hesitant to restart these medications due to risk of adverse effects in general dislike of taking scheduled medications.  He does interpret sleeping well with hydroxyzine and is comfortable continuing with that medication on an as needed basis.  Symptom intensity does appear to have improved since his initial arrival with the patient requesting to extend his stay to achieve further improvement before returning home. Nursing notes reviewed noting no acute issues.     I have reviewed the assessment completed by the [facility].     During the observation period, the patient did not require medications for  agitation, and did not require restraints/seclusion for patient and/or provider safety.     The patient was found to have a psychiatric condition that would benefit from an observation stay in the emergency department for further psychiatric stabilization and/or coordination of a safe disposition. The observation plan includes serial assessments of psychiatric condition, potential administration of medications if indicated, further disposition pending the patient's psychiatric course during the monitoring period.     Preliminary diagnosis:    ICD-10-CM    1. Substance abuse (H)  F19.10     cannabis, hallucinogens      2. Attention deficit hyperactivity disorder, combined type  F90.2       3. Bipolar affective disorder, currently depressed, moderate (H)  F31.32       4. Other insomnia  G47.09       5. Suicidal ideation  R45.851            Treatment Plan:  -Labs are reviewed noting elevation of transaminases, possibly related to reported alcohol use although his initial blood alcohol level was negative.  Plan to recheck liver function panel tomorrow to monitor the trend.  If transaminases remain abnormal, recommend following up with his primary care provider for additional evaluation.  -Urine drug screen was positive for cannabis; negative for stimulants and benzodiazepines  -Prescription monitoring database was reviewed noting that the patient picked up a 30-day supply of Adderall on September 30 and a prescription of Xanax on September 27  -Zyprexa 10 mg nightly for mood stabilization and reduction of paranoia.  -Continue hydroxyzine 25 mg as needed for reduction of anxiety and insomnia  -Referral for outpatient psychiatric medication management and psychotherapy  -Offer a chemical health assessment to aid in gathering abstinence promoting interventions  -Enter to observation status and reassess tomorrow    Addendum at 3:10pm:  Nursing staff reported that the patient was escalating with behavioral outbursts on the  unit.  He was demanding to be discharged.  There have been no imminent safety concerns related to suicidal ideation since his meeting with the therapist and myself earlier today.  Noting that the patient did not meet criteria to be placed under an involuntary hold, he was discharged back to the community per his request and against his treatment teams recommendations.      --  Naeem Munson MD   [facility] EMERGENCY DEPT  EmPATH Unit       Naeem Munson MD  10/21/24 1122       Naeem Munson MD  10/21/24 [id number]

## 2024-10-21 NOTE — ED TRIAGE NOTES
"Patient presents to the ER with his brother. Asked patient why they were in the emergency room and stated, \"Now is the time to prioritize me. I am looking to get some mental health resources\". Patient then removed all of the vital sign equipment while they were running, got out of the chair and started to pace around the triage room. Attempted to ask patient if he was hallucinating and he stated, \" Maybe a little bit but the drugs are in the bag\". Patient very disorganized with rambling thoughts     The patient was then escorted to room 18 with security presence for safety.      Triage Assessment (Adult)       Row Name 10/21/24 0022          Triage Assessment    Airway WDL WDL        Respiratory WDL    Respiratory WDL WDL        Skin Circulation/Temperature WDL    Skin Circulation/Temperature WDL WDL        Cardiac WDL    Cardiac WDL WDL        Peripheral/Neurovascular WDL    Peripheral Neurovascular WDL WDL        Cognitive/Neuro/Behavioral WDL    Cognitive/Neuro/Behavioral WDL X                     "

## 2024-10-22 ENCOUNTER — TELEPHONE (OUTPATIENT)
Dept: BEHAVIORAL HEALTH | Facility: CLINIC | Age: 34
End: 2024-10-22
Payer: COMMERCIAL

## 2024-10-22 ENCOUNTER — HOSPITAL ENCOUNTER (INPATIENT)
Facility: CLINIC | Age: 34
LOS: 3 days | Discharge: HOME | DRG: 897 | End: 2024-10-25
Attending: PSYCHIATRY & NEUROLOGY | Admitting: PSYCHIATRY & NEUROLOGY
Payer: COMMERCIAL

## 2024-10-22 VITALS
RESPIRATION RATE: 20 BRPM | HEIGHT: 71 IN | BODY MASS INDEX: 28.7 KG/M2 | DIASTOLIC BLOOD PRESSURE: 87 MMHG | OXYGEN SATURATION: 98 % | WEIGHT: 205 LBS | TEMPERATURE: 98.2 F | SYSTOLIC BLOOD PRESSURE: 135 MMHG | HEART RATE: 84 BPM

## 2024-10-22 DIAGNOSIS — G47.09 OTHER INSOMNIA: ICD-10-CM

## 2024-10-22 DIAGNOSIS — F41.1 ANXIETY STATE: Primary | ICD-10-CM

## 2024-10-22 PROBLEM — F29 PSYCHOSIS (H): Status: ACTIVE | Noted: 2024-10-22

## 2024-10-22 LAB
ANION GAP SERPL CALCULATED.3IONS-SCNC: 13 MMOL/L (ref 7–15)
ATRIAL RATE - MUSE: 106 BPM
BUN SERPL-MCNC: 14.6 MG/DL (ref 6–20)
CALCIUM SERPL-MCNC: 10.1 MG/DL (ref 8.8–10.4)
CHLORIDE SERPL-SCNC: 97 MMOL/L (ref 98–107)
CREAT SERPL-MCNC: 0.78 MG/DL (ref 0.67–1.17)
DIASTOLIC BLOOD PRESSURE - MUSE: NORMAL MMHG
EGFRCR SERPLBLD CKD-EPI 2021: >90 ML/MIN/1.73M2
GLUCOSE SERPL-MCNC: 103 MG/DL (ref 70–99)
HCO3 SERPL-SCNC: 26 MMOL/L (ref 22–29)
INTERPRETATION ECG - MUSE: NORMAL
P AXIS - MUSE: 55 DEGREES
POTASSIUM SERPL-SCNC: 3.3 MMOL/L (ref 3.4–5.3)
POTASSIUM SERPL-SCNC: 3.5 MMOL/L (ref 3.4–5.3)
PR INTERVAL - MUSE: 148 MS
QRS DURATION - MUSE: 86 MS
QT - MUSE: 342 MS
QTC - MUSE: 454 MS
R AXIS - MUSE: 68 DEGREES
SODIUM SERPL-SCNC: 136 MMOL/L (ref 135–145)
SYSTOLIC BLOOD PRESSURE - MUSE: NORMAL MMHG
T AXIS - MUSE: 25 DEGREES
VENTRICULAR RATE- MUSE: 106 BPM

## 2024-10-22 PROCEDURE — 80048 BASIC METABOLIC PNL TOTAL CA: CPT | Performed by: EMERGENCY MEDICINE

## 2024-10-22 PROCEDURE — 124N000002 HC R&B MH UMMC

## 2024-10-22 PROCEDURE — 250N000013 HC RX MED GY IP 250 OP 250 PS 637: Performed by: EMERGENCY MEDICINE

## 2024-10-22 PROCEDURE — 36415 COLL VENOUS BLD VENIPUNCTURE: CPT | Performed by: EMERGENCY MEDICINE

## 2024-10-22 PROCEDURE — 250N000013 HC RX MED GY IP 250 OP 250 PS 637: Performed by: PSYCHIATRY & NEUROLOGY

## 2024-10-22 PROCEDURE — 84132 ASSAY OF SERUM POTASSIUM: CPT | Performed by: EMERGENCY MEDICINE

## 2024-10-22 RX ORDER — OLANZAPINE 10 MG/2ML
10 INJECTION, POWDER, FOR SOLUTION INTRAMUSCULAR 3 TIMES DAILY PRN
Status: DISCONTINUED | OUTPATIENT
Start: 2024-10-22 | End: 2024-10-25 | Stop reason: HOSPADM

## 2024-10-22 RX ORDER — TRAZODONE HYDROCHLORIDE 50 MG/1
50 TABLET, FILM COATED ORAL
Status: DISCONTINUED | OUTPATIENT
Start: 2024-10-22 | End: 2024-10-25 | Stop reason: HOSPADM

## 2024-10-22 RX ORDER — MAGNESIUM HYDROXIDE/ALUMINUM HYDROXICE/SIMETHICONE 120; 1200; 1200 MG/30ML; MG/30ML; MG/30ML
30 SUSPENSION ORAL EVERY 4 HOURS PRN
Status: DISCONTINUED | OUTPATIENT
Start: 2024-10-22 | End: 2024-10-25 | Stop reason: HOSPADM

## 2024-10-22 RX ORDER — AMOXICILLIN 250 MG
1 CAPSULE ORAL 2 TIMES DAILY PRN
Status: DISCONTINUED | OUTPATIENT
Start: 2024-10-22 | End: 2024-10-25 | Stop reason: HOSPADM

## 2024-10-22 RX ORDER — OLANZAPINE 10 MG/1
10 TABLET ORAL 3 TIMES DAILY PRN
Status: DISCONTINUED | OUTPATIENT
Start: 2024-10-22 | End: 2024-10-25 | Stop reason: HOSPADM

## 2024-10-22 RX ORDER — DIVALPROEX SODIUM 500 MG/1
500 TABLET, DELAYED RELEASE ORAL DAILY
Status: DISCONTINUED | OUTPATIENT
Start: 2024-10-22 | End: 2024-10-25

## 2024-10-22 RX ORDER — ALBUTEROL SULFATE 90 UG/1
2 INHALANT RESPIRATORY (INHALATION) EVERY 6 HOURS
Status: DISCONTINUED | OUTPATIENT
Start: 2024-10-22 | End: 2024-10-22

## 2024-10-22 RX ORDER — POTASSIUM CHLORIDE 1500 MG/1
40 TABLET, EXTENDED RELEASE ORAL ONCE
Status: COMPLETED | OUTPATIENT
Start: 2024-10-22 | End: 2024-10-22

## 2024-10-22 RX ORDER — ALBUTEROL SULFATE 90 UG/1
2 INHALANT RESPIRATORY (INHALATION) EVERY 6 HOURS PRN
Status: DISCONTINUED | OUTPATIENT
Start: 2024-10-22 | End: 2024-10-25 | Stop reason: HOSPADM

## 2024-10-22 RX ORDER — HYDROXYZINE HYDROCHLORIDE 25 MG/1
25 TABLET, FILM COATED ORAL EVERY 4 HOURS PRN
Status: DISCONTINUED | OUTPATIENT
Start: 2024-10-22 | End: 2024-10-25 | Stop reason: HOSPADM

## 2024-10-22 RX ORDER — PROPRANOLOL HYDROCHLORIDE 10 MG/1
10 TABLET ORAL 3 TIMES DAILY
Status: DISCONTINUED | OUTPATIENT
Start: 2024-10-22 | End: 2024-10-22

## 2024-10-22 RX ORDER — TRIAMCINOLONE ACETONIDE 1 MG/G
OINTMENT TOPICAL
COMMUNITY

## 2024-10-22 RX ORDER — BETAMETHASONE DIPROPIONATE 0.5 MG/G
OINTMENT, AUGMENTED TOPICAL 2 TIMES DAILY
COMMUNITY

## 2024-10-22 RX ADMIN — POTASSIUM CHLORIDE 40 MEQ: 1500 TABLET, EXTENDED RELEASE ORAL at 05:22

## 2024-10-22 ASSESSMENT — ACTIVITIES OF DAILY LIVING (ADL)
ADLS_ACUITY_SCORE: 35
ADLS_ACUITY_SCORE: 31
ADLS_ACUITY_SCORE: 35
ADLS_ACUITY_SCORE: 35
ADLS_ACUITY_SCORE: 31
ADLS_ACUITY_SCORE: 35
ADLS_ACUITY_SCORE: 31
ADLS_ACUITY_SCORE: 31
HYGIENE/GROOMING: INDEPENDENT
ADLS_ACUITY_SCORE: 35
ADLS_ACUITY_SCORE: 35
ADLS_ACUITY_SCORE: 31
LAUNDRY: WITH SUPERVISION
ADLS_ACUITY_SCORE: 35
ADLS_ACUITY_SCORE: 31
ADLS_ACUITY_SCORE: 35
ORAL_HYGIENE: INDEPENDENT
ADLS_ACUITY_SCORE: 35
ADLS_ACUITY_SCORE: 31
DRESS: INDEPENDENT
ADLS_ACUITY_SCORE: 31
ADLS_ACUITY_SCORE: 31
ADLS_ACUITY_SCORE: 35
ADLS_ACUITY_SCORE: 31
ADLS_ACUITY_SCORE: 45

## 2024-10-22 ASSESSMENT — LIFESTYLE VARIABLES: SKIP TO QUESTIONS 9-10: 0

## 2024-10-22 NOTE — TELEPHONE ENCOUNTER
9:57 AM: Intake paged [facility] to present for station 30.    10:21 AM: Pt accepted to station 30/Corina.    10:32 AM: Pt placed in queue and added to admit board. Indicia completed.    10:49 AM: CRN informed that RN is in process of getting report.

## 2024-10-22 NOTE — TELEPHONE ENCOUNTER
04:33 - Called ED RN Federica re: 72HH needed, per Dr. Dick. Federica will speak w/ provider again    40 mEq of potassium has been ordered. 72 HH in chart - Initiated 10/22/24 @ 4:39AM    04:39 - Called [place] to request phone consult w/ provider    04:54 - Dr. Dick requests that potassium be rechecked after replacement and pt can be re-presented to provider at that time    04:58 - Updated ED RN that on-call provider would like potassium rechecked after replacement

## 2024-10-22 NOTE — PLAN OF CARE
10/22/24 1429   Patient Belongings   Did you bring any home meds/supplements to the hospital?  No   Patient Belongings locker   Patient Belongings Put in Hospital Secure Location (Security or Locker, etc.) bracelet;watch   Belongings Search Yes   Clothing Search Yes   Comment jim Roper    ..A               Admission:  I am responsible for any personal items that are not sent to the safe or pharmacy.  [facility] is not responsible for loss, theft or damage of any property in my possession.    Signature:  _________________________________ Date: _______  Time: _____                                              Staff Signature:  ____________________________ Date: ________  Time: _____      2nd Staff person, if patient is unable/unwilling to sign:    Signature: ________________________________ Date: ________  Time: _____     Discharge:  [facility] has returned all of my personal belongings:    Signature: _________________________________ Date: ________  Time: _____                                          Staff Signature:  ____________________________ Date: ________  Time: _____

## 2024-10-22 NOTE — ED NOTES
"Pt awake calm yet  confused. He is  sitting up in bed. No distress noted. VSS afebrile denied pain when asked. .  Pt stated \" Where am I and why am I here\" Pt was re-oriented to his environment and informed he is here to get help in managing  his mental health and alcohol use. Pt replied \"Oh!  Okay\"   "

## 2024-10-22 NOTE — CONSULTS
Diagnostic Evaluation Consultation  Crisis Assessment    Patient Name: Freddy Tolbert  Age:  33 year old  Legal Sex: male  Gender Identity: male  Pronouns:   Race: White  Ethnicity: Not  or   Language: English      Patient was assessed: Virtual: nuPSYS   Crisis Assessment Start Date: 10/21/24  Crisis Assessment Start Time: 2109  Crisis Assessment Stop Time: 2119  Patient location: [facility] EMERGENCY DEPT                             [facility]    Referral Data and Chief Complaint  Freddy Tolbert presents to the ED by  self. Patient is presenting to the ED for the following concerns: Worsening psychosocial stress, Substance use, Paranoia.   Factors that make the mental health crisis life threatening or complex are:  Pt presents for paranoia about taking unknown medications. Pt was found in [facility] and a code 21 was called due to his escalating behaviors. Pt presents as disoriented, confused, appears to struggle to speak and only responds with one word answers, and paranoid. Pt reports he is confused. Pt reports he believes he is a surgeon who has to operate on a friend for a heart transplant. Pt does not appear to recall events from earlier today, as he was discharged from [facility]. Per chart review, pt presented to ED earlier today for paranoia and believes the [employer] is out to get him and that he needs a . Per chart review, pt reported he had not been sleeping, had used marijuana, alcohol, adderall, fentanyl, and hallucinogens recently. Per chart review, pt was not suicidat and denies hallucinations at that time..      Informed Consent and Assessment Methods  Explained the crisis assessment process, including applicable information disclosures and limits to confidentiality, assessed understanding of the process, and obtained consent to proceed with the assessment.  Assessment methods included conducting a formal interview with patient, review of medical records,  collaboration with medical staff, and obtaining relevant collateral information from family and community providers when available.  : other (see comments) (no evidence of understanding)     Patient response to interventions: no evidence of understanding  Coping skills were attempted to reduce the crisis:  none     History of the Crisis   Per chart review, pt presented to ED earlier today for paranoia and believes the [place] [employer] is out to get him and that he needs a . Per chart review, pt reported he had not been sleeping, had used marijuana, alcohol, adderall, fentanyl, and hallucinogens recently. Per chart review, there was concern pt had a suicide plan that he was not disclosing. Per chart review, pt was not suicidal and denies hallucinations at that time.    Brief Psychosocial History  Family:  Single, Children no  Support System:   (unable to assess)  Employment Status:   (unable to assess)  Source of Income:   (unable to assess)  Financial Environmental Concerns:   (unable to assess)  Current Hobbies:   (unable to assess)  Barriers in Personal Life:   (unable to assess)    Significant Clinical History  Current Anxiety Symptoms:     Current Depression/Trauma:     Current Somatic Symptoms:  sweating, flushing, shaking, anxious  Current Psychosis/Thought Disturbance:     Current Eating Symptoms:     Chemical Use History:  Alcohol:  (unable to assess)  Benzodiazepines:  (unable to assess)  Opiates:  (unable to assess)  Cocaine:  (unable to assess)  Marijuana:  (unable to assess)  Other Use:  (unable to assess)   Past diagnosis:  ADHD, Bipolar Disorder, Depression, Substance Use Disorder  Family history:  No known history of mental health or chemical health concerns  Past treatment:  Inpatient Hospitalization, Psychiatric Medication Management  Details of most recent treatment:  Per chart review, pt has no current mental health services.  Other relevant history:  Per chart review, pt has a history  DWI.       Collateral Information  Is there collateral information:       Collateral information name, relationship, phone number:       What happened today:       What is different about patient's functioning:       Concern about alcohol/drug use:      What do you think the patient needs:      Has patient made comments about wanting to kill themselves/others:      If d/c is recommended, can they take part in safety/aftercare planning:       Additional collateral information:        Risk Assessment  Angelina Suicide Severity Rating Scale Full Clinical Version:  Suicidal Ideation  Q1 Wish to be Dead (Lifetime):  (unable to assess)  Q2 Non-Specific Active Suicidal Thoughts (Lifetime):  (unable to assess)  Q6 Suicide Behavior (Lifetime):  (unable to assess)     Suicidal Behavior (Lifetime)  Actual Attempt (Lifetime):  (unable to assess)  Has subject engaged in non-suicidal self-injurious behavior? (Lifetime):  (unable to assess)  Interrupted Attempts (Lifetime):  (unable to assess)  Aborted or Self-Interrupted Attempt (Lifetime):  (unable to assess)  Preparatory Acts or Behavior (Lifetime):  (unable to assess)    Angelina Suicide Severity Rating Scale Recent:   Suicidal Ideation (Recent)  Q1 Wished to be Dead (Past Month):  (unable to assess)  Q2 Suicidal Thoughts (Past Month):  (unable to assess)  Q3 Suicidal Thought Method:  (unable to assess)  Q4 Suicidal Intent without Specific Plan:  (unable to assess)  Q5 Suicide Intent with Specific Plan:  (unable to assess)  If yes to Q6, within past 3 months?:  (unable to assess)  Level of Risk per Screen: no risks indicated  Intensity of Ideation (Recent)  Most Severe Ideation Rating (Past 1 Month):  (unable to assess)  Frequency (Past 1 Month):  (unable to assess)  Duration (Past 1 Month):  (unable to assess)  Controllability (Past 1 Month):  (unable to assess)  Deterrents (Past 1 Month):  (unable to assess)  Reasons for Ideation (Past 1 Month):  (unable to assess)  Suicidal  Behavior (Recent)  Actual Attempt (Past 3 Months):  (unable to assess)  Has subject engaged in non-suicidal self-injurious behavior? (Past 3 Months):  (unable to assess)  Interrupted Attempts (Past 3 Months):  (unable to assess)  Aborted or Self-Interrupted Attempt (Past 3 Months):  (unable to assess)  Preparatory Acts or Behavior (Past 3 Months):  (unable to assess)    Environmental or Psychosocial Events:  (unable to assess)  Protective Factors: Protective Factors:  (unable to assess)    Does the patient have thoughts of harming others? Feels Like Hurting Others:  (unable to assess)  Previous Attempt to Hurt Others:  (unable to assess)  Current presentation: Confused  Is the patient engaging in sexually inappropriate behavior?:  (unable to assess)  Duty to warn initiated:  (unable to assess)  Duty to warn details: unable to assess    Is the patient engaging in sexually inappropriate behavior?   (unable to assess)        Mental Status Exam   Affect: Blunted, Constricted  Appearance: Disheveled  Attention Span/Concentration: Inattentive  Eye Contact: Intense    Fund of Knowledge: Delayed   Language /Speech Content: Fluent  Language /Speech Volume: Mute  Language /Speech Rate/Productions: Minimally Responsive  Recent Memory: Poor  Remote Memory: Poor  Mood: Anxious  Orientation to Person: No   Orientation to Place: No  Orientation to Time of Day: No  Orientation to Date: No     Situation (Do they understand why they are here?): No  Psychomotor Behavior: Agitated  Thought Content: Hallucinations, Delusions, Paranoia  Thought Form: Paranoia, Flight of Ideas, Tangential     Mini-Cog Assessment  Number of Words Recalled:    Clock-Drawing Test:     Three Item Recall:    Mini-Cog Total Score:       Medication  Psychotropic medications:   Medication Orders - Psychiatric (From admission, onward)      Start     Dose/Rate Route Frequency Ordered Stop    10/21/24 4110  OLANZapine zydis (zyPREXA) ODT tab 10 mg         10 mg Oral  2 TIMES DAILY PRN 10/21/24 9193               Current Care Team  Patient Care Team:  Joe Patel PA-C as PCP - General (Family Medicine)  Joe Patel PA-C as Assigned PCP    Diagnosis  Patient Active Problem List   Diagnosis Code    Attention deficit hyperactivity disorder, combined type F90.2    Anxiety state F41.1    History of vitamin D deficiency Z86.39    Suicidal ideation R45.851    Substance abuse (H) F19.10    Bipolar affective disorder, currently depressed, moderate (H) F31.32    Other insomnia G47.09    Unspecified mood (affective) disorder (H) F39       Primary Problem This Admission  Active Hospital Problems    *Unspecified mood (affective) disorder (H)        Clinical Summary and Substantiation of Recommendations   It is the recommendation of this clinician that pt admit to IP MH for safety and stabilization. Pt displays the following risk factors that support IP admission: Pt presents to ED after being found in the [employer] lobby paranoid about taking unknown pills. Pt appears to think he is a surgeon who needs to perform a heart transplant on a friend. Pt appears disoriented, minimally responsive, paranoia, possibly reacting to internal stimuli, and unable to engage in assessment appropriately. This is his second ED visit today, and was previously here for concerns with polysubstance abuse and suicide ideation. Pt presented similairly at the time of that first ED encounter. Pt appears to be decompensating. Pt is unable to engage in safety planning to mitigate risk level in a non-secure setting. Lower levels of care are not sufficient. Due to this IP is the least restrictive option of care for pt. Pt should remain in IP until deemed safe to return to the community and engage in OP MH supports. Pt would benefit from LIA assessment, outpatient therapy and medication services, and coordination with pt family for safety planning.       Imminent risk of harm:  ([place])  Severe psychiatric,  behavioral or other comorbid conditions are appropriate for management at inpatient mental health as indicated by at least one of the following: Psychiatric Symptoms, Impaired impulse control, judgement, or insight, Symptoms of impact to function  Severe dysfunction in daily living is present as indicated by at least one of the following: Extreme disruption in vegetative function, Extreme deterioration in social interactions, Complete withdrawal from all social interactions  Situation and expectations are appropriate for inpatient care: Biopsychosocial stresses potentially contributing to clinical presentation (co morbidities) have been assessed and are absent or manageable at proposed level of care  Inpatient mental health services are necessary to meet patient needs and at least one of the following: Specific condition related to admission diagnosis is present and judged likely to further improve at proposed level of care, Specific condition related to admission diagnosis is present and judged likely to deteriorate in absence of treatment at proposed level of care      Patient coping skills attempted to reduce the crisis:  none    Disposition  Recommended disposition: Inpatient Mental Health        Reviewed case and recommendations with attending provider. Attending Name: Dr. Will       Attending concurs with disposition:         Patient and/or validated legal guardian concurs with disposition:    (unable to assess)       Final disposition:  inpatient mental health    Legal status on admission: Voluntary/Patient has signed consent for treatment    Assessment Details   Total duration spent with the patient:       CPT code(s) utilized:      ANTONIO Branch, Psychotherapist  [facility] - Triage & Transition Services  Callback: [phone]

## 2024-10-22 NOTE — ED NOTES
Pt ate 100% of breakfast and tolerated. Transport ride to [facility] 30 arranged ETA for  is 30 mins.    Belligerent. Uncooperative with complete neuro exam./alert

## 2024-10-22 NOTE — PHARMACY-ADMISSION MEDICATION HISTORY
Pharmacist Admission Medication History    Admission medication history is complete. The information provided in this note is only as accurate as the sources available at the time of the update.    Information Source(s): Patient and CareEverywhere/SureScripts via phone    Pertinent Information:   Spoke with patient to complete medication history.   Xanax - patient reports that he rarely uses this.   Adderall - patient states that he would take one Adderall XR 20 mg capsule w/ Adderall 10 mg at 8 AM and then another Adderall XR 20 mg at 11 AM     Changes made to PTA medication list:  Added:   aluminum chloride (DRYSOL) 20 % external solution, Apply to affected areas once weekly  augmented betamethasone dipropionate (DIPROLENE-AF) 0.05 % external ointment, Apply topically 2 times daily.  triamcinolone (KENALOG) 0.1 % external ointment, Apply topically to the affected area 1 to 2 times daily as needed for eczema  Deleted:   Propranolol  Depakote  Changed: None    Allergies reviewed with patient and updates made in EHR: yes    Medication History Completed By: Gigi Mckeon PharmD 10/22/2024 5:11 PM    PTA Med List   Medication Sig Last Dose    albuterol (PROAIR HFA/PROVENTIL HFA/VENTOLIN HFA) 108 (90 Base) MCG/ACT inhaler Inhale 2 puffs into the lungs every 6 hours. PRN at PRN    ALPRAZolam (XANAX) 1 MG tablet Take 1 tablet (1 mg) by mouth as needed for anxiety. Past Month    aluminum chloride (DRYSOL) 20 % external solution Apply to affected areas once weekly Past Week    amphetamine-dextroamphetamine (ADDERALL XR) 20 MG 24 hr capsule Take 1 capsule (20 mg) by mouth 2 times daily. (Patient taking differently: Take 20 mg by mouth 2 times daily. 8 AM and 11 AM) Past Week    amphetamine-dextroamphetamine (ADDERALL) 5 MG tablet Take 2 tablets (10 mg) by mouth every morning. Past Week    augmented betamethasone dipropionate (DIPROLENE-AF) 0.05 % external ointment Apply topically 2 times daily. Past Week    triamcinolone  (KENALOG) 0.1 % external ointment Apply topically to the affected area 1 to 2 times daily as needed for eczema PRN at PRN

## 2024-10-22 NOTE — PLAN OF CARE
Team Note Due:  Wednesday     Assessment/Intervention/Current Symtoms and Care Coordination:  Chart review and met with team, discussed pt progress, symptomology, and response to treatment.  Discussed the discharge plan and any potential impediments to discharge.    Met with Freddy for psychosocial assessment.  He was tearful during the assessment and asked this writer how he came to this hospital.  This writer shared the information in the notes and he stated he had no idea what happened.  Freddy seemed confused and withdrawn during the assessment.  He was disoriented and tearful.  This writer suggested CD treatment as an option after discharge.  Freddy was open to this and said he is willing to try.  He stated the goal of this hospitalization is to get better.       Discharge Plan or Goal:  Referral to CD treatment      Barriers to Discharge:  None     Referral Status:  None      Legal Status:  72 hour hold 10/22/2024 at 4:38 AM.    Contacts (include OMARI status):  Loc Tolbert ([phone])       Upcoming Meetings and Dates/Important Information and next steps:  None

## 2024-10-22 NOTE — PLAN OF CARE
10/22/24 1500   General Information   Has Not Attended OT as of: 10/22/24     Pt has not attended scheduled occupational therapy sessions. Encourage attendance and participation. Pt will be given self-assessment form, and OT staff will explain the purpose of including them in their treatment plan and offer options for meeting their needs.

## 2024-10-22 NOTE — PLAN OF CARE
10/22/24 1714   Patient Belongings   Did you bring any home meds/supplements to the hospital?  Yes   Patient Belongings locker;sent to security per site process   Patient Belongings Put in Hospital Secure Location (Security or Locker, etc.) cell phone/electronics;clothing;necklace;wallet;cash/credit card   Belongings Search Yes   Clothing Search Yes   Comment belongings arrived after patient admission     Goal Outcome Evaluation:        In Locker: Clothing, underwear, shoes, cell phone, duffle bag, 2 glasses, 1 glasses, 2 wireless earphones, switch , case for switch, switch controllers, switch controller , phone/watch combo , switch console, switch games, chain bracelet, chain necklace x5, box  x3,  cord x3, , socks, ipad, notebook, book,2 mini flashlights, lip balm, toothpaste x2.toothbrush, compass, tea bags, gum, unidentified cylinder case, ID, HDMI Cord, Water bottle      Sent to Security: 3 pocket knives, Cash #[phone], [employer] Visa #[id number], [employer] #[id number], Gift Card Visa #[id number], [employer] #[id number], EBT #[id number]        A               Admission:  I am responsible for any personal items that are not sent to the safe or pharmacy.  [facility] is not responsible for loss, theft or damage of any property in my possession.    Signature:  _________________________________ Date: _______  Time: _____                                              Staff Signature:  ____________________________ Date: ________  Time: _____      2nd Staff person, if patient is unable/unwilling to sign:    Signature: ________________________________ Date: ________  Time: _____     Discharge:  [facility] has returned all of my personal belongings:    Signature: _________________________________ Date: ________  Time: _____                                          Staff Signature:  ____________________________ Date: ________  Time: _____

## 2024-10-22 NOTE — ED NOTES
"Received call from intake. MD at [facility] reviewing patient, but would need the following ordered/completed:  \"Repeat electrolyte, potassium, sodium\" labs  A 72 hour hold (since patient left AMA earlier)    If unable to obtain any of these, patient would not be accepted by the reviewing MD.  ED MD notified.  "

## 2024-10-22 NOTE — PLAN OF CARE
BEH IP Unit Acuity Rating Score (UARS)  Patient is given one point for every criteria they meet.    CRITERIA SCORING   On a 72 hour hold, court hold, committed, stay of commitment, or revocation. 0    Patient LOS on BEH unit exceeds 20 days. 0  LOS: 0   Patient under guardianship, 55+, otherwise medically complex, or under age 11. 0   Suicide ideation without relief of precipitating factors. 1   Current plan for suicide. 0   Current plan for homicide. 0   Imminent risk or actual attempt to seriously harm another without relief of factors precipitating the attempt. 0   Severe dysfunction in daily living (ex: complete neglect for self care, extreme disruption in vegetative function, extreme deterioration in social interactions). 0   Recent (last 7 days) or current physical aggression in the ED or on unit. 0   Restraints or seclusion episode in past 72 hours. 0   Recent (last 7 days) or current verbal aggression, agitation, yelling, etc., while in the ED or unit. 0   Active psychosis. 1   Need for constant or near constant redirection (from leaving, from others, etc).  0   Intrusive or disruptive behaviors. 0   Patient requires 3 or more hours of individualized nursing care per 8-hour shift (i.e. for ADLs, meds, therapeutic interventions). 0   TOTAL 2

## 2024-10-22 NOTE — TELEPHONE ENCOUNTER
12:50 AM Intake called [facility] and provided CRNEsa, with pt's MRN for review.     1:30 AM Intake received a call from [facility] on [facility] reporting this pt is appropriate for the bed that's available.     1:40 AM Intake called [facility] and spoke with repNeda.     2:24 AM Intake called [facility] and spoke with repWilliams.     2:30 AM Intake received a call from Dr. Dick requesting a repeat electrolytes, potassium, sodium, and 72HH due to     2:38 AM Intake called [facility] ED and spoke with RN, Jess. Intake requested above labs and 72HH.                 R: [place]  Access Inpatient Bed Call Log  10/21/2024 11:30PM  Intake has called facilities that have not updated their bed status within the last 12 hours.??      ADULTS:     *[facility] -- [facility]: @ Cap per website.  [place] -- [facility]:  @ Cap per website. -Per Carlos Eduardo @ 11:30PM, they are full  [place] -- [facility]: @ Cap per website.  [place] -- [facility]: @ Cap per website.  [place] -- [facility]: @ Cap per website.  [facility] -- [facility]: @ Cap per website. -Per Jocelyne @ 11:30PM, they are at capacity  [place] -- [facility]/YA beds: @ Cap per website. Ages 18-35, Voluntary only, COVID test req'd, NO aggression, physical or sexual assault, violence hx or drug abuse, or psychosis. - Per Jessie @ 11:38PM, 1 YA bed available  [place] -- [facility]: @ Cap per website.  [place] -- [facility]: @ cap per website.  [place] -- [facility]:  Posting 1 bed. -Per Mariela @ 11:34PM, [facility] is at full capacity until 8:30AM       Pt remains on waitlist pending appropriate placement availability.

## 2024-10-22 NOTE — ED PROVIDER NOTES
Transferred to [facility] by Ambulance under the care of Dr. Corina Ray, Vivi Isabel MD  10/22/24 6881
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
## 2024-10-22 NOTE — PROGRESS NOTES
Psychiatric Admission note.     S: Pt admitted to Station 30 on a 72 hour hold from [facility]. Pt cooperative with admission process including safety search. Pt given lunch.    B: Pt initially went to the ED at [facility] reporting depressed mood and suicidal thoughts. He was admitted to the EMPATH unit and also reported having used hallucinogens, alcohol, marijuana, stimulants and Fentanyl. He has a history of using Depakote and Zyprexa with a diagnosis of Bi-Polar disorder. He signed himself out of the EMPATH unit.He was later found in the [facility] lobby at [facility]. Presented confused, disoriented and struggling to speak. He made comments about the [place] [employer] being out to get him. He was taken back to the ED. He was subsequently placed on a 72 hour hold and transferred to [facility].    A: During interview with writer he was quite tearful. Able to participate in interview and speak logically. Slow to answer to questions at times. He was given a tour to the unit and asked to then go to his room to rest.    R: Pt cooperative with admission process. Presented and blunted and depressed. No unusual comments or behaviors shown.

## 2024-10-22 NOTE — TELEPHONE ENCOUNTER
S: [facility] ED , DEC  Karina  calling at 9:44 Pm about a 33 year old/Male presenting with catatonic     B: Pt arrived via Self . Presenting problem, stressors: Pt is presenting after second ED visit today. Pt is catatonic, confused, not oriented; pt believes he's a doctor who needs to perform a heart transplant.     Pt affect in ED: Blunted and Constricted  Pt Dx: Bipolar Disorder, ADHD, and Substance Use Disorder: Cannabis, hallucinogens, adderral, fentanyl  Previous IPMH hx? Yes: March 2024  Pt unable to be assessed for SI due to current presentation    Hx of suicide attempt? No, Unsure.   Pt denies SIB  Pt denies HI   Pt unable to be assessed for hallucinations due to current presentation .   Pt RARS Score: 6    Hx of aggression/violence, sexual offenses, legal concerns, Epic care plan? describe: No  Current concerns for aggression this visit? Yes: Code 21 in ED; dysregulated about ingesting pills.   Does pt have a history of Civil Commitment? No  Is Pt their own guardian? Yes    Pt is prescribed medication. Is patient medication compliant? No  Pt denies OP services   CD concerns: Actively using/consuming Cannabis, hallucinogens, adderral, fentanyl  Acute or chronic medical concerns: No-Medically cleared  Does Pt present with specific needs, assistive devices, or exclusionary criteria? None      Pt is ambulatory  Pt is able to perform ADLs independently      A: Pt to be reviewed for [facility] admission. Pt is Voluntary  Preferred placement: [facility]    COVID Symptoms: No  If yes, COVID test required   Utox: Positive for Cannabis    CMP: Not ordered, intake requested lab  CBC: Not ordered, intake requested lab  HCG: N/A    R: Patient cleared and ready for behavioral bed placement: Yes  Pt placed on [facility] worklist? Yes    Does Patient need a Transfer Center request created? Yes, writer completed Transfer Center request at: 9:55 PM

## 2024-10-22 NOTE — ED NOTES
IP MH Referral Acuity Rating Score (RARS)    LMHP complete at referral to IP MH, with DEC; and, daily while awaiting IP MH placement. Call score to PPS.  CRITERIA SCORING   New 72 HH and Involuntary for IP MH (not adolescent) 0/1   Boarding over 24 hours 0/1   Vulnerable adult at least 55+ with multiple co morbidities; or, Patient age 11 or under 0/1   Suicide ideation without relief of precipitating factors 0/1   Current plan for suicide 0/1   Current plan for homicide 0/1   Imminent risk or actual attempt to seriously harm another without relief of factors precipitating the attempt 0/1   Severe dysfunction in daily living (ex: complete neglect for self care, extreme disruption in vegetative function, extreme deterioration in social interactions) 1/1   Recent (last 2 weeks) or current physical aggression in the ED 1/1   Restraints or seclusion episode in ED 0/1   Verbal aggression, agitation, yelling, etc., while in the ED 1/1   Active psychosis with psychomotor agitation or catatonia 1/1   Need for constant or near constant redirection (from leaving, from others, etc).  1/1   Intrusive or disruptive behaviors 1/1   TOTAL Acuity Total Score: 6

## 2024-10-22 NOTE — PLAN OF CARE
Freddy DORADO Didi  October 21, 2024  Plan of Care Hand-off Note     Patient Care Path: inpatient mental health    Plan for Care:   It is the recommendation of this clinician that pt admit to IP MH for safety and stabilization. Pt displays the following risk factors that support IP admission: Pt presents to ED after being found in the [facility] lobby paranoid about taking unknown pills. Pt appears to think he is a surgeon who needs to perform a heart transplant on a friend. Pt appears disoriented, minimally responsive, paranoia, possibly reacting to internal stimuli, and unable to engage in assessment appropriately. This is his second ED visit today, and was previously here for concerns with polysubstance abuse and suicide ideation. Pt presented similairly at the time of that first ED encounter. Pt appears to be decompensating. Pt is unable to engage in safety planning to mitigate risk level in a non-secure setting. Lower levels of care are not sufficient. Due to this IP is the least restrictive option of care for pt. Pt should remain in IP until deemed safe to return to the community and engage in OP  supports. Pt would benefit from LIA assessment, outpatient therapy and medication services, and coordination with pt family for safety planning.    Identified Goals and Safety Issues: stabilize mental health    Overview:  none listed            Legal Status: Legal Status at Admission: Voluntary/Patient has signed consent for treatment    Psychiatry Consult: no       Updated   regarding plan of care.           Karina Lloyd [facility]

## 2024-10-22 NOTE — ED NOTES
IP MH Referral Acuity Rating Score (RARS)    LMHP complete at referral to IP MH, with DEC; and, daily while awaiting IP MH placement. Call score to PPS.  CRITERIA SCORING   New 72 HH and Involuntary for IP MH (not adolescent) 1/1   Boarding over 24 hours 0/1   Vulnerable adult at least 55+ with multiple co morbidities; or, Patient age 11 or under 0/1   Suicide ideation without relief of precipitating factors 0/1   Current plan for suicide 0/1   Current plan for homicide 0/1   Imminent risk or actual attempt to seriously harm another without relief of factors precipitating the attempt 0/1   Severe dysfunction in daily living (ex: complete neglect for self care, extreme disruption in vegetative function, extreme deterioration in social interactions) 1/1   Recent (last 2 weeks) or current physical aggression in the ED 1/1   Restraints or seclusion episode in ED 0/1   Verbal aggression, agitation, yelling, etc., while in the ED 1/1   Active psychosis with psychomotor agitation or catatonia 1/1   Need for constant or near constant redirection (from leaving, from others, etc).  1/1   Intrusive or disruptive behaviors 1/1   TOTAL Acuity Total Score: 7

## 2024-10-22 NOTE — ED PROVIDER NOTES
Patient signed out to me by Dr. Brian.  Plan at time of signout was transferred to [facility] for dual diagnosis inpatient treatment of alcohol and mental health.  [facility] is requesting 72-hour hold given patient left AMA.  I have observed the patient hallucinating and responding to stimuli in the hallway when no one is around.  He is apparently not oriented.  I placed him on a 72-hour hold given review of his history and my evaluation.  [facility] also excepting the patient if recheck of his potassium.  Potassium is 3.3 he was given oral potassium.  They request and recheck until they accept him.  He was signed over to my colleague, Dr. Ray, at the routine and to my shift with disposition pending mental health acceptance.     Ross Will MD  10/22/24 0767

## 2024-10-22 NOTE — DISCHARGE INSTRUCTIONS
Behavioral Discharge Planning and Instructions    Summary: You were admitted on 10/22/2024  due to Substance Induced Psychosis.  You were treated by Leighton Arriaga MD and discharged on 10/25/2024 from Station 30 to Home    Main Diagnosis: Substance induced psychosis     Health Care Follow-up:   Primary Care appointment: Tuesday, October 29, 2024 @ 3:55pm (check-in) via Telehealth   Provider: Joe Patel PA-C  Address: [facility], [address] [place] [id number]  Phone: [phone]  Note: To access this appointment, log into your [employer] account. A notification will also be sent via email ([email or web address]).     Individual Therapy appointment: Please contact the clinic or your therapist directly to schedule or confirm an appointment.   Provider: Teagan Dougherty   Location: [address] [address]  Phone: [phone]  Text: [phone]  Fax: [phone]     Referrals made for chemical dependency day treatment (virtual).  They will either call or email you for an intake appointment.     [email or web address]   Phone: [phone]   Address:  [address], [place] [address]  [email or web address]    [facility]   [phone]  Address:[address], [place] [address]   2.9 mi.  [email or web address]    Information will be faxed to your outpatient providers to ensure a healthy continuity of care for you.     Attend all scheduled appointments with your outpatient providers. Call at least 24 hours in advance if you need to reschedule an appointment to ensure continued access to your outpatient providers.     Major Treatments, Procedures and Findings:  You were provided with: medication evaluation and/or management, group therapy, individual therapy, CD evaluation/assessment, and milieu management    Symptoms to Report: increased confusion    Early warning signs can include: sleep disturbances increased unusual  thinking, such as paranoia or hearing voices    Safety and Wellness:  Take all medicines as directed.  Make no changes unless your doctor suggests them.      Follow treatment recommendations.  Refrain from alcohol and non-prescribed drugs.  If there is a concern for safety, call 911.    Resources:   Mental Health Crisis Resources  Throughout [place]: call **CRISIS (**974358)  Crisis Text Line: is available for free, 24/7 by texting [place] to 710469  Suicide Awareness Voices of Education (SAVE) (www.save.org): 787-690-PORI (2446)  The [place] Suicide Prevention Lifeline is now: 988 Suicide and Crisis Lifeline. Call 988 anytime.  National [place] on Mental Illness (www.[place].rosibel.org): 284.266.3130 or 739-837-3408.  Yeoo1dwmj: text the word LIFE to 69387 for immediate support and crisis intervention  Mental Health Consumer/Survivor Network of [place] (www.mhcsn.net): 664.871.7402 or 817-510-1905  Mental Health Association of [place] (www.mentalhealth.org): 911.867.1687 or 531-073-5747  Peer Support Connection [place] Warmline (PSC) 1-688.616.9783 Available from 5pm - 9am (7 days a week/365 days a year)  [facility] 1-206.125.7711 [facility] Crisis Response Services  {:PHR,SUDAVSRESOURCES}    General Medication Instructions:   See your medication sheet(s) for instructions.   Take all medicines as directed.  Make no changes unless your doctor suggests them.   Go to all your doctor visits.  Be sure to have all your required lab tests. This way, your medicines can be refilled on time.  Do not use any drugs not prescribed by your doctor.  Avoid alcohol.    Advance Directives:   Scanned document on file with [employer]? No scanned doc  Is document scanned? Pt states no documents  Honoring Choices Your Rights Handout: Informed and given  Was more information offered? Materials given    The Treatment team has appreciated the opportunity to work with you. If you have any questions or concerns about your recent admission, you can contact the unit  which can receive your call 24 hours a day, 7 days a week. They will be able to get in touch with a Provider if needed. The unit number is [phone] .

## 2024-10-23 LAB
ALBUMIN SERPL BCG-MCNC: 4.4 G/DL (ref 3.5–5.2)
ALP SERPL-CCNC: 155 U/L (ref 40–150)
ALT SERPL W P-5'-P-CCNC: 125 U/L (ref 0–70)
ANION GAP SERPL CALCULATED.3IONS-SCNC: 9 MMOL/L (ref 7–15)
AST SERPL W P-5'-P-CCNC: 102 U/L (ref 0–45)
BILIRUB SERPL-MCNC: 2.2 MG/DL
BUN SERPL-MCNC: 10.2 MG/DL (ref 6–20)
CALCIUM SERPL-MCNC: 10 MG/DL (ref 8.8–10.4)
CHLORIDE SERPL-SCNC: 98 MMOL/L (ref 98–107)
CHOLEST SERPL-MCNC: 239 MG/DL
CREAT SERPL-MCNC: 0.76 MG/DL (ref 0.67–1.17)
EGFRCR SERPLBLD CKD-EPI 2021: >90 ML/MIN/1.73M2
EST. AVERAGE GLUCOSE BLD GHB EST-MCNC: 91 MG/DL
GLUCOSE SERPL-MCNC: 86 MG/DL (ref 70–99)
HBA1C MFR BLD: 4.8 %
HCO3 SERPL-SCNC: 31 MMOL/L (ref 22–29)
HDLC SERPL-MCNC: 56 MG/DL
HIV 1+2 AB+HIV1 P24 AG SERPL QL IA: NONREACTIVE
LDLC SERPL CALC-MCNC: 166 MG/DL
NONHDLC SERPL-MCNC: 183 MG/DL
POTASSIUM SERPL-SCNC: 3.8 MMOL/L (ref 3.4–5.3)
PROT SERPL-MCNC: 7.9 G/DL (ref 6.4–8.3)
SODIUM SERPL-SCNC: 138 MMOL/L (ref 135–145)
TRIGL SERPL-MCNC: 87 MG/DL
TSH SERPL DL<=0.005 MIU/L-ACNC: 1.45 UIU/ML (ref 0.3–4.2)

## 2024-10-23 PROCEDURE — H2032 ACTIVITY THERAPY, PER 15 MIN: HCPCS

## 2024-10-23 PROCEDURE — 83036 HEMOGLOBIN GLYCOSYLATED A1C: CPT | Performed by: PSYCHIATRY & NEUROLOGY

## 2024-10-23 PROCEDURE — 82306 VITAMIN D 25 HYDROXY: CPT | Performed by: PSYCHIATRY & NEUROLOGY

## 2024-10-23 PROCEDURE — 87389 HIV-1 AG W/HIV-1&-2 AB AG IA: CPT | Performed by: PSYCHIATRY & NEUROLOGY

## 2024-10-23 PROCEDURE — 80061 LIPID PANEL: CPT | Performed by: PSYCHIATRY & NEUROLOGY

## 2024-10-23 PROCEDURE — 84443 ASSAY THYROID STIM HORMONE: CPT | Performed by: PSYCHIATRY & NEUROLOGY

## 2024-10-23 PROCEDURE — 124N000002 HC R&B MH UMMC

## 2024-10-23 PROCEDURE — 80053 COMPREHEN METABOLIC PANEL: CPT | Performed by: PSYCHIATRY & NEUROLOGY

## 2024-10-23 PROCEDURE — 97150 GROUP THERAPEUTIC PROCEDURES: CPT | Mod: GO

## 2024-10-23 PROCEDURE — 36415 COLL VENOUS BLD VENIPUNCTURE: CPT | Performed by: PSYCHIATRY & NEUROLOGY

## 2024-10-23 PROCEDURE — 250N000013 HC RX MED GY IP 250 OP 250 PS 637: Performed by: PSYCHIATRY & NEUROLOGY

## 2024-10-23 PROCEDURE — 99222 1ST HOSP IP/OBS MODERATE 55: CPT | Performed by: PSYCHIATRY & NEUROLOGY

## 2024-10-23 RX ORDER — TRIAMCINOLONE ACETONIDE 1 MG/G
OINTMENT TOPICAL 2 TIMES DAILY PRN
Status: DISCONTINUED | OUTPATIENT
Start: 2024-10-23 | End: 2024-10-25 | Stop reason: HOSPADM

## 2024-10-23 RX ORDER — BETAMETHASONE DIPROPIONATE 0.5 MG/G
OINTMENT, AUGMENTED TOPICAL 2 TIMES DAILY
Status: DISCONTINUED | OUTPATIENT
Start: 2024-10-23 | End: 2024-10-25 | Stop reason: HOSPADM

## 2024-10-23 RX ORDER — OLANZAPINE 5 MG/1
5 TABLET ORAL AT BEDTIME
Status: DISCONTINUED | OUTPATIENT
Start: 2024-10-23 | End: 2024-10-25

## 2024-10-23 RX ADMIN — DIVALPROEX SODIUM 500 MG: 500 TABLET, DELAYED RELEASE ORAL at 07:58

## 2024-10-23 RX ADMIN — BETAMETHASONE DIPROPIONATE 1 G: 0.5 OINTMENT, AUGMENTED TOPICAL at 21:02

## 2024-10-23 ASSESSMENT — ACTIVITIES OF DAILY LIVING (ADL)
ADLS_ACUITY_SCORE: 0
DRESS: INDEPENDENT
ORAL_HYGIENE: INDEPENDENT
LAUNDRY: WITH SUPERVISION
ADLS_ACUITY_SCORE: 0
ADLS_ACUITY_SCORE: 0
ADLS_ACUITY_SCORE: 31
ADLS_ACUITY_SCORE: 0
HYGIENE/GROOMING: INDEPENDENT
ADLS_ACUITY_SCORE: 0

## 2024-10-23 NOTE — PLAN OF CARE
Problem: Psychotic Signs/Symptoms  Goal: Improved Mood Symptoms (Psychotic Signs/Symptoms)  Outcome: Progressing   Goal Outcome Evaluation:             Pt. Presents with a flat and guarded affect, pleasant on approach, calm and cooperative all shift. Visible in the lounge isolative to self, attended and participated in groups, including community meeting. Pt. Had a morning lab draw, later requested a vitamin D and HIV test, Dr. JOSIAH Oglesby, tests ordered.  Pt. Declined Depakote medication stating he doesn't need it. Out in the lounge for meals adequate appetite. Speech is clear and coherent, maintains eye contact, thought process is organized when answering questions, appears distracted at times. Hygiene is well kempt.     Pt. Endorses anxiety and depression 5/10, denies SI/HI/SIB and hallucinations, denies pain. Contracted for safety.     [address]) visited with pt. Today, pt. Told her that he's hearing voices.      Blood pressure (!) 135/91, pulse 102, temperature 98.4  F (36.9  C), temperature source Oral, resp. rate 16, weight 96.7 kg (213 lb 1.6 oz), SpO2 98%.

## 2024-10-23 NOTE — PLAN OF CARE
BEH IP Unit Acuity Rating Score (UARS)  Patient is given one point for every criteria they meet.    CRITERIA SCORING   On a 72 hour hold, court hold, committed, stay of commitment, or revocation. 0    Patient LOS on BEH unit exceeds 20 days. 0  LOS: 1   Patient under guardianship, 55+, otherwise medically complex, or under age 11. 0   Suicide ideation without relief of precipitating factors. 1   Current plan for suicide. 0   Current plan for homicide. 0   Imminent risk or actual attempt to seriously harm another without relief of factors precipitating the attempt. 0   Severe dysfunction in daily living (ex: complete neglect for self care, extreme disruption in vegetative function, extreme deterioration in social interactions). 0   Recent (last 7 days) or current physical aggression in the ED or on unit. 0   Restraints or seclusion episode in past 72 hours. 0   Recent (last 7 days) or current verbal aggression, agitation, yelling, etc., while in the ED or unit. 0   Active psychosis. 1   Need for constant or near constant redirection (from leaving, from others, etc).  0   Intrusive or disruptive behaviors. 0   Patient requires 3 or more hours of individualized nursing care per 8-hour shift (i.e. for ADLs, meds, therapeutic interventions). 0   TOTAL 2

## 2024-10-23 NOTE — PLAN OF CARE
Goal Outcome Evaluation:    NOC Shift Report    Pt was in bed at the beginning of the shift. Breathing was regular, spontaneous, and unlabored. Pt did not present with any medical concerns this shift. There were no  PRNs given. Pt has labs this morning. The plan of care is ongoing.       Pt appears to sleep for 6.5 hours.

## 2024-10-23 NOTE — CONSULTS
"SPIRITUAL HEALTH SERVICES  SPIRITUAL ASSESSMENT consult Note  [facility] ([facility] [place]) 32N     REFERRAL SOURCE: Routine consult    Met with Freddy in his room.     Spiritual background:   He shared that \"my parents were missionaries\" and that growing up he did a lot of \"home Synagogue. And bouncing from Synagogue to Synagogue, my dad was from [employer] and anytime a Synagogue would say something he didn't like we'd try a new one.\"     He is interested in \"Pentecostal\" and that this interest stems from \"I'm a psychonaut, that means I like to experience new things\" and that \"my person from another universe told me about what we built together. She showed me that when you die and go to another dimension we built a thing were people all come together and are reborn. Sort of like Pentecostal.\"     Freddy explained that he turned \"to booze when after this girl I was dating told me that God said that we aren't mean to be together. And that just hurt. Why would God say something like that? I grew really angry and resentful of God and turned away from him.\"     Current hospitalization:   Freddy he shared that \"I still don't know what happened that put me in here. I've heard a little about what happened, but I don't know what happened to my stuff, maybe I stashed it in bushes when I ran? Why did they let me leave with no one there?\"     He explained that \"I hadn't slept for 7 days\" and that \"the booze just gets to me. Last time this happened I hadn't slept for 5 days.\" I encouraged him to accept resources to help him sleep while he is here.     Freddy was tearful as he explained \"the voice that I hear, I don't tell other people, because it's nice to have some secrets to myself. She's my person, and sometimes she lets me see her face a little bit, but sometimes she's just a voice.\" He explained that \"it was really inspiring, it was so beautiful.\" And that he does not want to lose that feeling \"And that's why I don't want to take a pill, I " "want to hold on to that feeling.\"     The voice Freddy hears \"told me that I made her sad. That I broke the thing we had built together. I never want her to be sad.\"     He cried as he explained that he wants to \"rebuild who I am. I can't even look at myself in the mirror, I don't like what I see. I can't drive anymore, I'm just a burden.\" We discussed how recovery can start from places like this and that being in the hospital is exactly where he needs to be since hospitals are places for rest and for healing.     Financial stressors:   Freddy explained that he is concerned he will lose his job, due both to missing work from being in the hospital, \"And I missed a couple days of work because of booze\". He also has concerns about his insurance. I encouraged him to speak with the  on the unit.     Resources:   He shared that he gets a lot of his ideas from \"Cirilo Salgado\" (a Chinese Zoroastrian Author). We discussed what other resources I could get for him while he is here, we decided on \"Judaism information, on substance recovery\" and he agreed that finding recovery meetings at the [facility] might be helpful.     PLAN: Will provide resources on Restorationist and substance recovery and recovery meetings hosted at the [facility] per Freddy's request. Spiritual health services remains available for any follow-up or requests. For further visits please place spiritual health consults.    Morenita Hancock, [facility]ate   Pager: [phone]    "

## 2024-10-23 NOTE — PROGRESS NOTES
Rehab Group    Start time: 1015  End time: 1200  Patient time total: 45 minutes    attended full group    #7 attended   Group Type: occupational therapy   Group Topic Covered: coping skills     Group Session Detail:  OT clinic     Patient Response/Contribution:  cooperative with task, attentive, and actively engaged     Patient Detail:    Pt actively participated in occupational therapy clinic to facilitate coping skill exploration, creative expression within personally meaningful activities, and clinical observation of social, cognitive, and kinesthetic performance skills. Pt response: Oriented to group materials, and subsequently independent to initiate, gather materials, sequence, and adjust to workspace demands as needed. Demonstrated fair attention to task and attention to detail for selected goal-directed task. Able to ask for assistance as needed, and socialized with peers and staff. Shared that he enjoys playing guitar and playing piano in his free time. Calm, pleasant, and engaged. Affect appeared to brighten in interactions. Politely thanked writer for group at the end.      [id number] OT Group (2 or more in attendance)      Patient Active Problem List   Diagnosis    Attention deficit hyperactivity disorder, combined type    Anxiety state    History of vitamin D deficiency    Suicidal ideation    Substance abuse (H)    Bipolar affective disorder, currently depressed, moderate (H)    Other insomnia    Unspecified mood (affective) disorder (H)    Psychosis (H)

## 2024-10-23 NOTE — PLAN OF CARE
Patient has spent majority of the shift in his room sleeping/napping. Stated he was feeling tired and wanted to sleep. A bit tense and irritable when telling him dinner was here and to offer his scheduled Depakote. Declined his medication. Denies suicidal ideation and self injurious thoughts. Denies anxiety and depression.    Patient evaluation continues. Assessed mood,anxiety,thoughts and behavior.     Patient gradually progressing towards goals.    Patient is encouraged to participate in groups and assisted to develop healthy coping skills.     VS reviewed: BP (!) 135/91 (Patient Position: Sitting)   Pulse 102   Temp 98  F (36.7  C) (Oral)   Resp 16   Wt 96.7 kg (213 lb 1.6 oz)   BMI 29.72 kg/m      Length of stay: 0    Refer to daily team meeting notes for individualized plan of care. Nursing will continue to assess.

## 2024-10-23 NOTE — PLAN OF CARE
"Goal Outcome Evaluation:    Patient presents with a neutral affect with smiling noted. Reports mood as \"I'm good. Doing better than I was.\" Is pleasant, polite, calm, and cooperative. Thought content presents as relevant. Thought process presents as relevant. Speech is clear and coherent. Eye contact is good. Patient denies depression, anxiety, suicidal ideation, SIB, homicidal ideation, and auditory/visual hallucinations. No medical issues noted. Vital signs stable. Patient was somewhat medication compliant. Refused scheduled HS Zyprexa. Attended group this shift. Showered this shift. Ate his evening meal. Patient was out in the milieu this shift watching movies. No interaction with peers noted. Patient had a visitor this shift. Visit went well.    /78 (Patient Position: Sitting)   Pulse 83   Temp 98.1  F (36.7  C) (Oral)   Resp 16   Wt 96.7 kg (213 lb 1.6 oz)   SpO2 97%   BMI 29.72 kg/m                     "

## 2024-10-23 NOTE — PLAN OF CARE
10/23/24 1023   Individualization/Patient Specific Goals   Patient Personal Strengths independent living skills;insight into illness/situation;positive educational history;positive vocational history   Patient Vulnerabilities history of unsuccessful treatment;substance abuse/addiction;poor impulse control;traumatic event   Anxieties, Fears or Concerns Patient is refusing medications.  Patient requests an HIV test.   Individualized Care Needs Medication management, group therapy, individual therapy, support from unit staff.   Patient/Family-Specific Goals (Include Timeframe) Stabilize and discharge to substance use treatment.   Interprofessional Rounds   Summary Patient was admitted to [facility] on 10/21/2024.  He discharged and then was found in the skyway exhibiting disorganized and unsafe behavior.  Code 21 was called and patient was stabilized then transferred to [facility] ED.  Patient presents to the ED with suicidal ideation, paranoia about cartel following him and tested positive on utox screen for cannibas and alcohol.  Patient has been tearful since coming to the unit.  Patient is voluntary and has not asked to discharge.  Patient is also refusing medications and requests an HIV test.   Participants nursing;psychiatrist;CTC;OT   Behavioral Team Discussion   Participants Leighton Saucedo MD. Martha Mckeon LPC. Judit Hewitt RN.   Progress Patient has not requested to leave.   Anticipated length of stay 1-2 weeks.   Continued Stay Criteria/Rationale Stabilization and find placement for substance abuse treatment.   Medical/Physical Requests HIV test.   Precautions Suicide   Plan Stabilize and discharge to substance use treatment.   Rationale for change in precautions or plan No changes.   Safety Plan Safety plan to be completed by unit psychotherapist.   Anticipated Discharge Disposition substance use treatment     PRECAUTIONS AND SAFETY    Behavioral Orders   Procedures    Code 1 - Restrict to  Unit    Routine Programming     As clinically indicated    Status 15     Every 15 minutes.    Suicide precautions: Suicide Risk: MODERATE; Clinical rationale to override score: response to medication, Exhibiting Suicidal/self-harm behaviors or thoughts     Patients on Suicide Precautions should have a Combination Diet ordered that includes a Diet selection(s) AND a Behavioral Tray selection for Safe Tray - with utensils, or Safe Tray - NO utensils       Order Specific Question:   Suicide Risk     Answer:   MODERATE     Order Specific Question:   Clinical rationale to override score:     Answer:   response to medication     Order Specific Question:   Clinical rationale to override score:     Answer:   Exhibiting Suicidal/self-harm behaviors or thoughts       Safety  Safety WDL: WDL  Suicidality: Status 15

## 2024-10-23 NOTE — PLAN OF CARE
Team Note Due:  Wednesday     Assessment/Intervention/Current Symtoms and Care Coordination:  Chart review and met with team, discussed pt progress, symptomology, and response to treatment.  Discussed the discharge plan and any potential impediments to discharge.    In team it was reported that Freddy is refusing medications and requests an HIV test.   called to report to RN that he says he is having auditory hallucinations but hasn't told anyone.  Freddy will also have a roommate.       Discharge Plan or Goal:  Referral to CD treatment      Barriers to Discharge:  None     Referral Status:  None      Legal Status:  72 hour hold 10/22/2024 at 4:38 AM.    Contacts (include OMARI status):  Loc Tolbert ([phone])       Upcoming Meetings and Dates/Important Information and next steps:  None

## 2024-10-23 NOTE — H&P
"[facility], [place]   Psychiatric History and Physical.    Chief complaint and reason for admission:     Confusion, psychosis in context of illicit drugs use and alcohol withdrawal.    History of present illness: per ED note: Freddy Tolbert presents to the ED by  self. Patient is presenting to the ED for the following concerns: Worsening psychosocial stress, Substance use, Paranoia.   Factors that make the mental health crisis life threatening or complex are:  Pt presents for paranoia about taking unknown medications. Pt was found in [facility] and a code 21 was called due to his escalating behaviors. Pt presents as disoriented, confused, appears to struggle to speak and only responds with one word answers, and paranoid. Pt reports he is confused. Pt reports he believes he is a surgeon who has to operate on a friend for a heart transplant. Pt does not appear to recall events from earlier today, as he was discharged from [facility]. Per chart review, pt presented to ED earlier today for paranoia and believes the [place] Cartel is out to get him and that he needs a . Per chart review, pt reported he had not been sleeping, had used marijuana, alcohol, adderall, fentanyl, and hallucinogens recently. Per chart review, pt was not suicidat and denies hallucinations at that time.     Current Anxiety Symptoms:     Current Depression/Trauma:     Current Somatic Symptoms:  sweating, flushing, shaking, anxious  Current Psychosis/Thought Disturbance:     Current Eating Symptoms:     During visit with this provider: patient went to the conference room willingly and talked to me. Presented as guarded and not very forthcoming with the information. Frequently said: \"I don't remember\" to my questions about circumstances of his admission. Admitted to not sleeping for 6-7 days and using THC products and drinking alcohol and denied using hallucinogens, stimulants and fentanyl despite earlier admitting " using it (UDS was positive for THC only). Reported stopping drinking alcohol about one week ago, experiencing Auditory hallucinations of voices repeating sentences or talking about him. Suggested that some of his symptoms might be because of coexisting tinnitus. Denied feeling paranoid and said that he didn't recall how he signed himself out of [facility] and, then, was found at [facility] confused and disoriented. Said that it was suggested to him that he might have Bipolar affective disorder, but said that he stopped taking all meds because of preference to take only natural substances. Could not say with certainty whether he had major MI symptoms while being sober and not going through withdrawal. Voiced a lot of reluctance to start taking meds, said that he would, rather, take medicinal marijuana, but, eventually, agreed to restart Zyprexa. Most common side effects were discussed with the patient to the patient's satisfaction. Freddy showed very little interest in going to a formal CD treatment, even, after I reminded him about his recent psych hospitalization with symptoms of psychosis also seemed to be triggered by substance use, alcoholic hepatitis on this admission and 3 DWIs. He said that he would not go to residential CD treatment, but might consider going to outpatient program if it is not too far from his residence. Agreed to meet with CD counselor.    Past psychiatric history: The patient was hospitalized one time in 3/24, denied Suicide attempts, SIB. He tried Depakote and Zyprexa for mood stabilizers and didn't like neither. He has been on different antidepressant medications, which did not work for him. He has been on Adderall up to 50 mg a day and has been taking it off and on for the last 2 months. He started experiencing depression in his college. He was never treated with ECT, TMS or ketamine infusion. He reported highs and lows in mood, which was happening for a long time. He states that he  "has highs at least for 1 week once in every 2-6 months. During his highs, he has very high energy and feels like his mind is spinning, like tires in the mud. He becomes more distracted, unable to understand simple things and instructions because his mind is full of ideas. He is not able to sleep during that time and in contrary to his lows which happens 2-3 times per year for more than 2 weeks. He spends a lot of time in isolation during his lows, does not have energy to deal with anything in life. His hygiene becomes poor and he spends more time in the bed, not necessarily sleeping all the time. It appears to him that everything around him is \"gray and dark,\". His self-esteem goes down. He does not eat, experience difficulty in remembering things and focusing.     PAST SUBSTANCE USE HISTORY:   The patient reported that he used to drink on a daily basis from half to a liter of tequila until a month ago. He had 3 DWIs. He has stopped drinking one month ago, but he told ER clinician 10 days ago. He has been evaluated for CD, but never did more than few classes. He has been using cannabis for a long time until 02/15, but he told to ER clinician that his last use was 10 days ago. He stopped smoking one year ago. Recently reported using hallucinogens, stimulants, fentanyl, today denied using them.     Past medical history:   Diagnosis Date    Arthritis 12/2021    Depressive disorder 01/2014    Uncomplicated asthma Highschool ~14 yrs ago     Past Surgical History:   Procedure Laterality Date    ORTHOPEDIC SURGERY   6 months old     Club foot     Family and social history: FAMILY PSYCHIATRIC HISTORY:   The patient reported that his paternal uncle had bipolar disorder, paternal grandfather had bipolar disorder and completed suicide, maternal uncle and aunt suffered from bipolar disorder. His maternal side of the family suffered from alcohol and drug use. His brother is a recovering alcoholic.    SOCIAL HISTORY:   Born and " raised in [place], had some college. Reported good childhood and states that his parents were missionaries in [place]. He has given up his gun to police recently. 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 He does not have any children or relationship. Had 3 DWIs and doesn't have driving license.         Medications:     Current Facility-Administered Medications   Medication Dose Route Frequency Provider Last Rate Last Admin    augmented betamethasone dipropionate (DIPROLENE-AF) 0.05 % ointment   Topical BID Leighton Arriaga MD        divalproex sodium delayed-release (DEPAKOTE) DR tablet 500 mg  500 mg Oral Daily Leighton Arriaga MD   500 mg at 10/23/24 0758    OLANZapine (zyPREXA) tablet 5 mg  5 mg Oral At Bedtime Leighton Arriaga MD              Allergies:   No Known Allergies       Labs:     Recent Results (from the past 24 hours)   Comprehensive metabolic panel    Collection Time: 10/23/24  8:33 AM   Result Value Ref Range    Sodium 138 135 - 145 mmol/L    Potassium 3.8 3.4 - 5.3 mmol/L    Carbon Dioxide (CO2) 31 (H) 22 - 29 mmol/L    Anion Gap 9 7 - 15 mmol/L    Urea Nitrogen 10.2 6.0 - 20.0 mg/dL    Creatinine 0.76 0.67 - 1.17 mg/dL    GFR Estimate >90 >60 mL/min/1.73m2    Calcium 10.0 8.8 - 10.4 mg/dL    Chloride 98 98 - 107 mmol/L    Glucose 86 70 - 99 mg/dL    Alkaline Phosphatase 155 (H) 40 - 150 U/L     (H) 0 - 45 U/L     (H) 0 - 70 U/L    Protein Total 7.9 6.4 - 8.3 g/dL    Albumin 4.4 3.5 - 5.2 g/dL    Bilirubin Total 2.2 (H) <=1.2 mg/dL   Hemoglobin A1c    Collection Time: 10/23/24  8:33 AM   Result Value Ref Range    Estimated Average Glucose 91 <117 mg/dL    Hemoglobin A1C 4.8 <5.7 %   Lipid panel    Collection Time: 10/23/24  8:33 AM   Result Value Ref Range    Cholesterol 239 (H) <200 mg/dL    Triglycerides 87 <150 mg/dL    Direct Measure HDL 56 >=40 mg/dL    LDL Cholesterol Calculated 166 (H) <100 mg/dL    Non HDL Cholesterol 183  (H) <130 mg/dL   TSH with free T4 reflex and/or T3 as indicated    Collection Time: 10/23/24  8:33 AM   Result Value Ref Range    TSH 1.45 0.30 - 4.20 uIU/mL          Psychiatric Examination:     BP (!) 135/91 (Patient Position: Sitting)   Pulse 102   Temp 98.4  F (36.9  C) (Oral)   Resp 16   Wt 96.7 kg (213 lb 1.6 oz)   SpO2 98%   BMI 29.72 kg/m    Weight is 213 lbs 1.6 oz  Body mass index is 29.72 kg/m .                Sitting Orthostatic BP: 128/90      Sitting Orthostatic Pulse: 88 bpm      Standing Orthostatic BP: 133/92      Standing Orthostatic Pulse: 102 bpm       Appearance: awake, alert, dressed in hospital scrubs, and appeared as age stated  Attitude:  guarded and somewhat cooperative  Eye Contact:  fair  Mood:  anxious  Affect:  guarded and restricted range  Speech:  clear, coherent  Psychomotor Behavior:  no evidence of tardive dyskinesia, dystonia, or tics and intact station, gait and muscle tone  Throught Process:  goal oriented  Associations:  no loose associations  Thought Content:  no evidence of suicidal ideation or homicidal ideation and no evidence of psychotic thought  Insight:  limited  Judgement:  poor  Oriented to:  time, person, and place  Attention Span and Concentration:  limited  Recent and Remote Memory:  limited       Review of systems:     For physical examination and 12 point review of system please, see note of Jason Taylor, from 10/21/24.  I reviewed that note and agree with it.         DIagnoses:     -Bipolar disorder, unspecified.   -Alcohol use disorder.   -Marijuana use disorder.   -History of ADHD, depression.  -Rule out bipolar disorder type I with psychotic features vs substance induced psychosis.         Plan:     Patient reluctantly agreed to restart Zyprexa, will do that. Will order CD consult. Xanax and Adderall were discontinued. Will for now, continue 72 hour hold and try to gather more collateral info. Will continue to provide support and  structure.      Total time spent was 56 minutes. Over 50% of times was spent counseling and coordination of care regarding coping skills, medication and discharge planning.

## 2024-10-24 PROCEDURE — 124N000002 HC R&B MH UMMC

## 2024-10-24 PROCEDURE — 97150 GROUP THERAPEUTIC PROCEDURES: CPT | Mod: GO

## 2024-10-24 PROCEDURE — 90832 PSYTX W PT 30 MINUTES: CPT | Performed by: COUNSELOR

## 2024-10-24 PROCEDURE — 99232 SBSQ HOSP IP/OBS MODERATE 35: CPT | Performed by: PSYCHIATRY & NEUROLOGY

## 2024-10-24 RX ADMIN — BETAMETHASONE DIPROPIONATE 1 G: 0.5 OINTMENT, AUGMENTED TOPICAL at 20:31

## 2024-10-24 RX ADMIN — BETAMETHASONE DIPROPIONATE: 0.5 OINTMENT, AUGMENTED TOPICAL at 09:03

## 2024-10-24 ASSESSMENT — ACTIVITIES OF DAILY LIVING (ADL)
ADLS_ACUITY_SCORE: 0
DRESS: INDEPENDENT
HYGIENE/GROOMING: INDEPENDENT
ADLS_ACUITY_SCORE: 0
ORAL_HYGIENE: INDEPENDENT
ADLS_ACUITY_SCORE: 0
HYGIENE/GROOMING: INDEPENDENT
ADLS_ACUITY_SCORE: 0
LAUNDRY: WITH SUPERVISION
DRESS: INDEPENDENT
ADLS_ACUITY_SCORE: 0
ADLS_ACUITY_SCORE: 0
ORAL_HYGIENE: INDEPENDENT
ADLS_ACUITY_SCORE: 0
LAUNDRY: WITH SUPERVISION
ADLS_ACUITY_SCORE: 0

## 2024-10-24 NOTE — PROGRESS NOTES
Rehab Group    Start time: 1700  End time: 1800  Patient time total: 60 minutes    attended full group    #9 attended   Group Type: art   Group Topic Covered: activity therapy       Group Session Detail:  Art Therapy directive was to create a mini collage in response to a chosen positive affirmation and/or personal intention.  Goals of directive: to identify personal strengths and goals, to express aspects of self, self-esteem, emotional expression, emotional regulation     Patient Response/Contribution:  cooperative with task       Patient Detail:    Pt was an engaged participant, focused on task for the full duration of group. Pt finished artwork and briefly verbally processed with author and group. Pt created art in response to a positive affirmation from handout.  Pts mood was calm, pleasant participant.      Activity Therapy Per 15 min ()      Patient Active Problem List   Diagnosis    Attention deficit hyperactivity disorder, combined type    Anxiety state    History of vitamin D deficiency    Suicidal ideation    Substance abuse (H)    Bipolar affective disorder, currently depressed, moderate (H)    Other insomnia    Unspecified mood (affective) disorder (H)    Psychosis (H)

## 2024-10-24 NOTE — PROGRESS NOTES
"  Type Of Assessment: Inpatient Substance Use Comprehensive Assessment    Referral Source:  [facility], [address]  Unit Phone: [phone]  MRN: [id number]    DATE OF SERVICE: October 24, 2024  Date of previous LIA Assessment: 5 years ago  Patient confirmed identity through two factor verification: Full Legal Name and SSN    PATIENT'S NAME: Freddy \"Justin\" Didi  Age: 33 year old  Last 4 SSN: [id number]  Sex: male   Gender Identity: male  Sexual Orientation: Heterosexual  Cultural Background: \"English, White\"  YOB: 1990  Current Address:   [address] [place] [address]  Patient Phone Number:  [phone]   Patient's E-Mail Contact:  [email or web address]  Funding: [employer]  PMI: n.a  Emergency Contact: No emergency contact information on file.    DAANES information was provided to patient and patient does not want a copy.     Telemedicine Visit: The patient's condition can be safely assessed and treated via synchronous audio and visual telemedicine encounter.    Reason for Telemedicine Visit: Services only offered telehealth  Originating Site (Patient Location): [address] [address]  Distant Site (Provider Location): Provider Remote Setting- Home Office  Consent:  The patient/guardian has verbally consented to: the potential risks and benefits of telemedicine (video visit) versus in person care; bill my insurance or make self-payment for services provided; and responsibility for payment of non-covered services.   Mode of Communication:  telephone    START TIME: 9:29am  END TIME: 10:09am    As the provider I attest to compliance with applicable laws and regulations related to telemedicine.   Freddy Tolbert was seen for a substance use disorder consult on 10/24/2024 by DARLENE De Jesus.    Reason for Substance Use Disorder Consult:   Pt is interested in LIA " "treatment at this time because \"just to see if, not necessary to a specified treatment, to gather a plan to be successful. I go through a cycle, even without chemicals. My brother is 11 months sober.\"    Per 10/21/24 ED Progress Note:  Freddy Tolbert is a 33 year old male with a history including bipolar affective disorder, substance abuse, social anxiety, depressive disorder, ADHD, and suicidal ideation who presents to the ED for psychiatric evaluation. The patient went to the ED this morning for difficulties sleeping and depression. He noted taking several different drugs prior to arrival. He was medically cleared and sent to [facility]. At [facility] he became agitated and left against his treatment teams recommendation. Then, he was found anxious, diaphoretic, and paranoid in the skyway lobby. Alongside this, his last drink was 3 days ago.     Are you currently having severe withdrawal symptoms that are putting yourself or others in danger? No  Are you currently having severe medical problems that require immediate attention? No  Are you currently having severe emotional or behavioral problems that are putting yourself or others at risk of harm? No    Have you participated in prior substance use disorder evaluations? Yes. When, Where, and What circumstances: 5 years ago   Have you ever been to detox, inpatient or outpatient treatment for substance related use? List previous treatment: No   Have you ever had a gambling problem or had treatment for compulsive gambling? No  Have you ever felt the need to bet more and more money? No  Have you ever had to lie to people important to you about how much you gambled? No    Patient does not appear to be in severe withdrawal, an imminent safety risk to self or others, or requiring immediate medical attention and may proceed with the assessment interview.  Comprehensive Substance Use History   X X = Primary Drug Used Age of First Use    Pattern of Substance Use   (heaviest use " "in life and a use history within the past year if applicable) (DSM-5: Sx #3) Date /  Quantity of last use if within the past 30 days Withdrawal Potential?   Method of use  (Oral, smoked, snorted, IV, etc)   x Alcohol   20 HU: \"in general right after a traumatic event, like a relationship break up, or something is out of control.\"    Past year: he would drink about two times a week. He can drink a 750ml of tequila each time.   10-11 days ago no oral   x Marijuana/Hashish   20 Past year: he will use about 3 times per week. He will use about 1/2 a gram per day. He will take a puff here and there throughout the day.   2-3 weeks ago no smoke    Cocaine/Crack No use        Meth/Amphetamines   24 Adderall:  First rx: 23 y/o  Last use: 3 weeks ago  Dose: 50mg, two 20XR tabs in the morning and then 10mg IR in the afternoon.  Abuse: no 3 weeks ago no oral    Heroin   No use        Other Opiates/Synthetics   No use        Inhalants  No use        Benzodiazepines   7 years ago Xanax:  First rx: 7 years ago  Dose: takes PRN  Last use: 6 months ago.  Abuse: no   6 months ago no oral    Hallucinogens   20 Mushrooms:  First use: 20  Last use: 1 year ago  Used 8-10 times in his life. 1 year ago no oral    Barbiturates/Sedatives/Hypnotics   No use        Over-the-Counter Drugs   No use        Other   No use        Nicotine   20 Cigarettes: off and on since age 22 2 months ago no smoke     Withdrawal symptoms: Have you had any of the following withdrawal symptoms?  \"being tired, lethargic, usually 2 days of cold sweats and shaking.\"    Have you experienced any cravings?  No    Have you had periods of abstinence?  Yes   What was your longest period? 6-12 months  How: \"just picked up something new. Stay busy with something else.\"  Any circumstances that lead to relapse? Boredom.     What activities have you engaged in when using alcohol/other drugs that could be hazardous to you or others?  The patient reported having a history of " "driving while under the influence of alcohol or drugs.    A description of any risk-taking behavior, including behavior that puts the client at risk of exposure to blood-borne or sexually transmitted diseases: none reported.    Arrests and legal interventions related to substance use: history of 3 DWIs and a possession of small amount of cannabis from years ago. He is not on probation at this time.    A description of how the patient's use affected their ability to function appropriately in a work setting: he would call in sick.    A description of how the patient's use affected their ability to function appropriately in an educational setting: \"I don't think it did.\"    Leisure time activities that are associated with substance use: n/a    Do you think your substance use has become a problem for you? \"I would say in the way of me having DWis.\"    MEDICAL HISTORY  Physical or medical concerns or diagnoses:   Patient Active Problem List   Diagnosis    Attention deficit hyperactivity disorder, combined type    Anxiety state    History of vitamin D deficiency    Suicidal ideation    Substance abuse (H)    Bipolar affective disorder, currently depressed, moderate (H)    Other insomnia    Unspecified mood (affective) disorder (H)    Psychosis (H)     Do you have any current medical treatment needs not being addressed by inpatient treatment?  no    Do you need a referral for a medical provider? no    Current medications:   Current Facility-Administered Medications   Medication Dose Route Frequency Provider Last Rate Last Admin    albuterol (PROVENTIL HFA/VENTOLIN HFA) inhaler  2 puff Inhalation Q6H PRN Leighton Arriaga MD        alum & mag hydroxide-simethicone (MAALOX) suspension 30 mL  30 mL Oral Q4H PRN Leighton Arriaga MD        augmented betamethasone dipropionate (DIPROLENE-AF) 0.05 % ointment   Topical BID Leighton Arriaga MD   Given at 10/24/24 0903    divalproex sodium delayed-release (DEPAKOTE) " "DR tablet 500 mg  500 mg Oral Daily Leighton Arriaga MD   500 mg at 10/23/24 0758    hydrOXYzine HCl (ATARAX) tablet 25 mg  25 mg Oral Q4H PRN Leighton Arriaga MD        OLANZapine (zyPREXA) tablet 10 mg  10 mg Oral TID PRN Leighton Arriaga MD        Or    OLANZapine (zyPREXA) injection 10 mg  10 mg Intramuscular TID PRN Leighton Arriaga MD        OLANZapine (zyPREXA) tablet 5 mg  5 mg Oral At Bedtime Leighton Arriaga MD        senna-docusate (SENOKOT-S/PERICOLACE) 8.6-50 MG per tablet 1 tablet  1 tablet Oral BID PRN Leighton Arriaga MD        traZODone (DESYREL) tablet 50 mg  50 mg Oral At Bedtime PRN Leighton Arriaga MD        triamcinolone (KENALOG) 0.1 % ointment   Topical BID PRN Leighton Arriaga MD           Are you pregnant? NA, Male    Do you have any specific physical needs/accommodations? No    MENTAL HEALTH HISTORY:  Have you ever had  hospitalizations or treatment for mental health illness: Yes. When, Where, and What circumstances: Pt is currently hospitalized for his MH.    Mental health history, including diagnosis and symptoms, and the effect on the client's ability to function: \"maybe a minimal form of bipolar.\"    Per 10/23/24 H&P:  Past psychiatric history: The patient was hospitalized one time in 3/24, denied Suicide attempts, SIB. He tried Depakote and Zyprexa for mood stabilizers and didn't like neither. He has been on different antidepressant medications, which did not work for him. He has been on Adderall up to 50 mg a day and has been taking it off and on for the last 2 months. He started experiencing depression in his college. He was never treated with ECT, TMS or ketamine infusion. He reported highs and lows in mood, which was happening for a long time. He states that he has highs at least for 1 week once in every 2-6 months. During his highs, he has very high energy and feels like his mind is spinning, like tires in the mud. He becomes more " "distracted, unable to understand simple things and instructions because his mind is full of ideas. He is not able to sleep during that time and in contrary to his lows which happens 2-3 times per year for more than 2 weeks. He spends a lot of time in isolation during his lows, does not have energy to deal with anything in life. His hygiene becomes poor and he spends more time in the bed, not necessarily sleeping all the time. It appears to him that everything around him is \"gray and dark,\". His self-esteem goes down. He does not eat, experience difficulty in remembering things and focusing.     Current mental health treatment including psychotropic medication needed to maintain stability: (Note: The assessment must utilize screening tools approved by the commissioner pursuant to section 245.4863 to identify whether the client screens positive for co-occurring disorders): he has a therapist.    GAIN-SS Tool:      10/24/2024     9:00 AM   When was the last time that you had significant problems...   with feeling very trapped, lonely, sad, blue, depressed or hopeless about the future? Past month   with sleep trouble, such as bad dreams, sleeping restlessly, or falling asleep during the day? Past Month   with feeling very anxious, nervous, tense, scared, panicked or like something bad was going to happen? Past month   with becoming very distressed & upset when something reminded you of the past? Past month   with thinking about ending your life or committing suicide? 1+ years ago         10/24/2024     9:00 AM   When was the last time that you did the following things 2 or more times?   Lied or conned to get things you wanted or to avoid having to do something? Past month   Had a hard time paying attention at school, work or home? Past month   Had a hard time listening to instructions at school, work or home? Past month   Were a bully or threatened other people? Past month   Started physical fights with other people? " "Never     Have you ever been verbally, emotionally, physically or sexually abused?   no    Family history of substance use and misuse: his brother is 11 months sober.    The patient's desire for family involvement in the treatment program: n/a  Level of family support: \"The best that I could have.\"    Social network in relation to expected support for recovery: he has attended sober support groups in the past.    Are you currently in a significant relationship? No    Do you have any children (include living arrangements/custody/contact)?:  no    What is your current living situation? \"I live in the basement apartment in a house.\" He lives alone.    Are you employed/attending school? 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    SUMMARY:  Ability to understand written treatment materials: Yes  Ability to understand patient rules and patient rights: Yes  Does the patient recognize needs related to substance use and is willing to follow treatment recommendations: Yes  Does the patient have an opioid use disorder:  does not have a history of opiate use.    ASAM Dimension Scale Ratings:    Dimension 1 -  Acute Intoxication/Withdrawal: 0 - No Problem  Dimension 2 - Biomedical: 1 - Minor Problem  Dimension 3 - Emotional/Behavioral/Cognitive Conditions: 2 - Moderate Problem  Dimension 4 - Readiness to Change:  2 - Moderate Problem  Dimension 5 - Relapse/Continued Use/ Continued Problem Potential: 3 - Severe Problem  Dimension 6 - Recovery Environment:  2 - Moderate Problem    Category of Substance Severity (ICD-10 Code / DSM 5 Code)     Alcohol Use Disorder Severe  (10.20) (303.90)   Cannabis Use Disorder Moderate  (F12.20) (304.30)   Hallucinogen Use Disorder The patient does not meet the criteria for a Hallucinogen use disorder.   Inhalant Use Disorder The patient does not meet the criteria for an Inhalant use disorder.   Opioid Use Disorder The patient does not meet the criteria for an Opioid " use disorder.   Sedative, Hypnotic, or Anxiolytic Use Disorder The patient does not meet the criteria for a Sedative/Hypnotic use disorder.   Stimulant Related Disorder The patient does not meet the criteria for a Stimulant use disorder.   Tobacco Use Disorder The patient does not meet the criteria for a Tobacco use disorder.   Other (or unknown) Substance Use Disorder The patient does not meet the criteria for a Other (or unknown) Substance use disorder.     A problematic pattern of alcohol/drug use leading to clinically significant impairment or distress, as manifested by at least two of the following, occurring within a 12-month period:    2.) There is a persistent desire or unsuccessful efforts to cut down or control alcohol/drug use  3.) A great deal of time is spent in activities necessary to obtain alcohol, use alcohol, or recover from its effects.  6.) Continued alcohol use despite having persistent or recurrent social or interpersonal problems caused or exacerbated by the effects of alcohol/drug.  8.) Recurrent alcohol/drug use in situations in which it is physically hazardous.  9.) Alcohol/drug use is continued despite knowledge of having a persistent or recurrent physical or psychological problem that is likely to have been caused or exacerbated by alcohol.  10.) Tolerance, as defined by either of the following: A need for markedly increased amounts of alcohol/drug to achieve intoxication or desired effect.  11.) Withdrawal, as manifested by either of the following: The characteristic withdrawal syndrome for alcohol/drug (refer to Criteria A and B of the criteria set for alcohol/drug withdrawal).    Specify if: In early remission:  After full criteria for alcohol/drug use disorder were previously met, none of the criteria for alcohol/drug use disorder have been met for at least 3 months but for less than 12 months (with the exception that Criterion A4,  Craving or a strong desire or urge to use  alcohol/drug  may be met).     In sustained remission:   After full criteria for alcohol use disorder were previously met, non of the criteria for alcohol/drug use disorder have been met at any time during a period of 12 months or longer (with the exception that Criterion A4,  Craving or strong desire or urge to use alcohol/drug  may be met).     Specify if:   This additional specifier is used if the individual is in an environment where access to alcohol is restricted.    Mild: Presence of 2-3 symptoms  Moderate: Presence of 4-5 symptoms  Severe: Presence of 6 or more symptoms    Collateral information:   The patient's medical record at [facility] was reviewed and the information contained in the medical record supported the patient's account of his chemical use history and chemical use consequences.    Recommendations:   1)  Complete an Intensive Outpatient CD Treatment Program.  2)  Abstain from all mood-altering chemicals unless prescribed by a licensed provider.   3)  Attend, at minimum, 2 weekly support group meetings, such as 12 step based (AA/NA), SMART Recovery, Health Realizations, and/or Refuge Recovery meetings.     4)  Actively work with a male mentor/sponsor on a weekly basis.   5)  Follow all the recommendations of your treatment/medical providers.    Clinical Substantiation:    Pt has never attended a LIA treatment program before. He reports having three DWIs and he does not have a 's license as a result. He appears to minimize the impact alcohol has had on his life. It has impacted his mental health, his job performance, and his relationships. Pt tends to over think and drinks to relieve boredom. Pt would benefit from an IOP LIA program to learn more about his addiction and to learn additional sober coping skills.    Referrals/ Alternatives:  [facility]: (in-person & virtual)  [address],   [place] [address].  Phone: [phone]  Fax: [phone]  email:   [email or web address]  [email or web address]    [facility], [facility]  [address] [place] [address]  Phone: [phone]  fax: [phone]  email: [email or web address]  [email or web address]    [facility]:  Locations: [place], [place], [place], [facility], and [facility].  Phone: [phone]  Fax:  [phone]  Email: [email or web address]  email: [email or web address]  [email or web address]  *Virtual IOP:  Day program: Monday through Thursday from 9 a - noon.   Evening program: Monday through Thursday from 5 p - 8 p.    [facility] (IOP- Men & Women)  Phone: [phone]  Fax: [phone]  [email or web address]  [address] [address]  [email or web address]  *AM program hours are 9:30am-12:05pm  *PM program hours are 6:30pm-9:05pm, Monday-Thursday.    [facility]   General Admissions  [address] [place] [address]  Phone: [phone]  Fax:  [phone]  Email: [email or web address]  [email or web address]  *IOP: 9-19 hours a week, no housing    LIA consult completed by: [facility].  Phone Number: [phone]  E-mail Address: [email or web address] Mental [email or web address] and Addiction [email or web address] Evaluation Department  [address] [place], [place] [id number]     *Due to regulation of Title 42 of the Code of Federal Regulations (CFR) Part 2: Confidentiality laws apply to this note and the information wherein.  Thus, this note cannot be copy and pasted into any other health care staff's note nor can it be included in general medical records sent to ANY outside agency without the patient's written consent.

## 2024-10-24 NOTE — PROGRESS NOTES
SPIRITUAL HEALTH SERVICES  SPIRITUAL ASSESSMENT progress Note  [facility] ([facility]     REFERRAL SOURCE: Materials request    Provided Freddy with a packet of information on [facility] and recovery, as well as the information for a [facility] recovery group.     PLAN: Spiritual health services remains available for any follow-up or requests. For further visits please place spiritual health consults.    Morenita Hancock [facility]  Associate   Pager: [phone]

## 2024-10-24 NOTE — PROGRESS NOTES
10/24/2024  Pt signed ROIs for [facility] and [facility] today. I emailed his LIA CA to both programs today.    [employer] [facility]  [address] [place] [address]  Phone: [phone]  fax: [phone]  email: [email or web address]  [email or web address]     [facility]:  Locations: [facility], [place], [facility], and [facility].  Phone: [phone]  Fax:  [phone]  Email: [email or web address]  email: [email or web address]  [email or web address]  *Virtual IOP:  Day program: Monday through Thursday from 9 a - noon.   Evening program: Monday through Thursday from 5 p - 8 p.    Lashae Vergara MA [facility]  LIA Evaluation Counselor  [phone]  [email or web address]

## 2024-10-24 NOTE — PLAN OF CARE
"Goal Outcome Evaluation:    Patient presents with a neutral affect with smiling noted. Reports mood as \"Okay.\" Is pleasant, polite, calm, and cooperative. Thought content presents as relevant. Thought process presents as relevant. Speech is clear and coherent. Eye contact is good. Patient denies depression, anxiety, suicidal ideation, SIB, homicidal ideation, and auditory/visual hallucinations. No medical issues noted. Vital signs stable. Patient was somewhat medication compliant. Refused scheduled HS Zyprexa. Did not attend group this shift. Ate his evening meal. Patient was out in the milieu this shift watching movies and a football game. Social with select peers.     /77 (Patient Position: Sitting)   Pulse 85   Temp 99.7  F (37.6  C) (Oral)   Resp 16   Wt 100.8 kg (222 lb 4.8 oz)   SpO2 99%   BMI 31.00 kg/m      Patient to possibly discharge AMA tomorrow Friday 10/25.                 "

## 2024-10-24 NOTE — PROGRESS NOTES
"[facility], [place]   Psychiatric Progress Note        Interim History:   The patient's care was discussed with the treatment team during the daily team meeting and/or staff's chart notes were reviewed.  Staff report patient slept for about 6.5 hours last night. Socialized with some peers, but mostly preferred to stay by himself. Refused to take both Depakote and Zyprexa on grounds that he had tried them before and had side effects. Met with CD counselor, but voiced little desire to enroll into a program and CD counselor didn't feel that he would be a good candidate for CD treatment. Voiced no psychotic symptoms, denied Suicidal ideation.    Met with patient: he was seen in the conference room. He confirmed that he just talked to CD counselor and would not go to a formal CD program neither inpatient nor outpatient. Said that he would not take medications: \"I don't like taking meds\". Denied presence of Suicidal ideation, Homicidal thoughts, didn't voice any delusional ideas. I suggested to him that his psychotic symptoms most likely were substance/withdrawal induced and that because he refuses to enroll into CD program and take meds we would discharge him AMA. Freddy agreed with that, asked to discharge him tomorrow. He had no more questions or concerns other than results of lab tests. I informed him that his HIV test was negative and he was glad to hear that.         Medications:     Current Facility-Administered Medications   Medication Dose Route Frequency Provider Last Rate Last Admin    augmented betamethasone dipropionate (DIPROLENE-AF) 0.05 % ointment   Topical BID Leighton Arriaga MD   Given at 10/24/24 0903    divalproex sodium delayed-release (DEPAKOTE) DR tablet 500 mg  500 mg Oral Daily Leighton Arriaga MD   500 mg at 10/23/24 0758    OLANZapine (zyPREXA) tablet 5 mg  5 mg Oral At Bedtime Leighton Arriaga MD              Allergies:   No Known Allergies       " Labs:     Recent Results (from the past 24 hours)   HIV Antigen Antibody Combo Cascade    Collection Time: 10/23/24  3:30 PM   Result Value Ref Range    HIV Antigen Antibody Combo Nonreactive Nonreactive          Psychiatric Examination:     /78 (Patient Position: Sitting)   Pulse 83   Temp 97.6  F (36.4  C) (Oral)   Resp 16   Wt 100.8 kg (222 lb 4.8 oz)   SpO2 98%   BMI 31.00 kg/m    Weight is 222 lbs 4.8 oz  Body mass index is 31 kg/m .    Orthostatic Vitals         Most Recent      Sitting Orthostatic /91 10/24 0852    Sitting Orthostatic Pulse (bpm) 85 10/24 0852    Standing Orthostatic /89 10/24 0852    Standing Orthostatic Pulse (bpm) 94 10/24 0852          Appearance: awake, alert and appeared as age stated  Attitude:  somewhat cooperative  Eye Contact:  fair  Mood:  better  Affect:  constricted mobility  Speech:  clear, coherent  Psychomotor Behavior:  no evidence of tardive dyskinesia, dystonia, or tics  Throught Process:  linear and goal oriented  Associations:  no loose associations  Thought Content:  no evidence of suicidal ideation or homicidal ideation and no evidence of psychotic thought  Insight:  limited  Judgement:  poor  Oriented to:  time, person, and place  Attention Span and Concentration:  fair  Recent and Remote Memory:  fair    Clinical Global Impressions  First: 4/3 10/24/2024      Most recent:            Precautions:     Behavioral Orders   Procedures    Code 1 - Restrict to Unit    Routine Programming     As clinically indicated    Status 15     Every 15 minutes.    Suicide precautions: Suicide Risk: MODERATE; Clinical rationale to override score: response to medication, Exhibiting Suicidal/self-harm behaviors or thoughts     Patients on Suicide Precautions should have a Combination Diet ordered that includes a Diet selection(s) AND a Behavioral Tray selection for Safe Tray - with utensils, or Safe Tray - NO utensils       Order Specific Question:   Suicide Risk      Answer:   MODERATE     Order Specific Question:   Clinical rationale to override score:     Answer:   response to medication     Order Specific Question:   Clinical rationale to override score:     Answer:   Exhibiting Suicidal/self-harm behaviors or thoughts          DIagnoses:     -Substance induced mood disorder with psychosis seems to be more likely.  -Bipolar disorder, unspecified.   -Alcohol use disorder.   -Marijuana use disorder.   -History of ADHD, depression.         Plan:     Refuses to go to CD treatment, take meds. Will discharge AMA tomorrow, See discussion above.     Total time spent was 37 minutes. Over 50% of times was spent counseling and coordination of care regarding coping skills, medication and discharge planning.

## 2024-10-24 NOTE — PLAN OF CARE
BEH IP Unit Acuity Rating Score (UARS)  Patient is given one point for every criteria they meet.    CRITERIA SCORING   On a 72 hour hold, court hold, committed, stay of commitment, or revocation. 0    Patient LOS on BEH unit exceeds 20 days. 0  LOS: 2   Patient under guardianship, 55+, otherwise medically complex, or under age 11. 0   Suicide ideation without relief of precipitating factors. 0   Current plan for suicide. 0   Current plan for homicide. 0   Imminent risk or actual attempt to seriously harm another without relief of factors precipitating the attempt. 0   Severe dysfunction in daily living (ex: complete neglect for self care, extreme disruption in vegetative function, extreme deterioration in social interactions). 0   Recent (last 7 days) or current physical aggression in the ED or on unit. 0   Restraints or seclusion episode in past 72 hours. 0   Recent (last 7 days) or current verbal aggression, agitation, yelling, etc., while in the ED or unit. 0   Active psychosis. 1   Need for constant or near constant redirection (from leaving, from others, etc).  0   Intrusive or disruptive behaviors. 0   Patient requires 3 or more hours of individualized nursing care per 8-hour shift (i.e. for ADLs, meds, therapeutic interventions). 0   TOTAL 1

## 2024-10-24 NOTE — CONSULTS
10/24/2024  Pt completed his LIA CA today. He is ambivalent about his need for LIA treatment. He is willing to look over different IOP LIA tx options. I emailed treatment options to his CTC and to his personal email today.    Next Steps:  1) pt picks a tx program.  2) pt informs me or the unit sw which tx program he wants to go to.  3) pt signs OMARI for tx program.  4) either myself or unit SW sends LIA CA to tx program.    WILLIAM Service Initiation Date ID: [id number]    Recommendations:   1)  Complete an Intensive Outpatient CD Treatment Program.  2)  Abstain from all mood-altering chemicals unless prescribed by a licensed provider.   3)  Attend, at minimum, 2 weekly support group meetings, such as 12 step based (AA/NA), SMART Recovery, Health Realizations, and/or Refuge Recovery meetings.     4)  Actively work with a male mentor/sponsor on a weekly basis.   5)  Follow all the recommendations of your treatment/medical providers.    Clinical Substantiation:    Pt has never attended a LIA treatment program before. He reports having three DWIs and he does not have a 's license as a result. He appears to minimize the impact alcohol has had on his life. It has impacted his mental health, his job performance, and his relationships. Pt tends to over think and drinks to relieve boredom. Pt would benefit from an IOP LIA program to learn more about his addiction and to learn additional sober coping skills.    Referrals/ Alternatives:  [facility]: (in-person & virtual)  [address],   [place] [address].  Phone: [phone]  Fax: [phone]  email:  [email or web address]  [email or web address]    [facility]  [address] [place] [address]  Phone: [phone]  fax: [phone]  email: [email or web address]  [email or web address]    [facility]:  Locations: [place], [place], [place], [place], [place], and [facility].  Phone: [phone]  Fax:   [phone]  Email: IOPreferrals@"[email or web address]"  email: [email or web address]  [email or web address]  *Virtual IOP:  Day program: Monday through Thursday from 9 a - noon.   Evening program: Monday through Thursday from 5 p - 8 p.    [facility] (IOP- Men & Women)  Phone: [phone]  Fax: [phone]  [email or web address]  [address]  [place] [address]  [email or web address]  *AM program hours are 9:30am-12:05pm  *PM program hours are 6:30pm-9:05pm, Monday-Thursday.    [facility]   General Admissions  [address] [place] [address]  Phone: [phone]  Fax:  [phone]  Email: [email or web address]  [email or web address]  *IOP: 9-19 hours a week, no housing    LIA consult completed by: Lashae Xavier [facility].  Phone Number: [phone]  E-mail Address: [email or web address] Mental Health and Addiction Services Evaluation Department  [address] [place] [address]     *Due to regulation of Title 42 of the Code of Federal Regulations (CFR) Part 2: Confidentiality laws apply to this note and the information wherein.  Thus, this note cannot be copy and pasted into any other health care staff's note nor can it be included in general medical records sent to ANY outside agency without the patient's written consent.

## 2024-10-24 NOTE — PLAN OF CARE
"  Problem: Sleep Disturbance  Goal: Adequate Sleep/Rest  Outcome: Progressing   Goal Outcome Evaluation:       Nursing Assessment    Recent Vitals: B/P:142/91  T:97.6  P:85    General Shift Summary  Visible in milieu occasionally but kept to self. No behavioral concerns. Denies all MH sx. Says they are sleeping well now relative to how they usually sleep.     Patient refused Depakote. Pt says that they do not want to take any psych medication. Pt reports that they do not want to take medication because they had side effects from Depakote and Zyprexa during their last hospitalization. When asked what side effects they experienced the pt says that, \" I had all of the side effects. I Googled the side effects and I had like all of them.\" When prompted for specifics the pt says that they felt like their personality was dulled and that they were unable to sleep for seven days but when they quit taking medication that this resolved. Writer tried discussing this further but pt was not seeming receptive to med education. Pt says that they believe their symptoms started because they reduced their adderall dosage.     Hygiene and appetite are appropriate.     Chip Rose RN     "

## 2024-10-24 NOTE — PLAN OF CARE
Rehab Group    Start time: 1015  End time: 1145  Patient time total: 50 minutes    attended partial group    #8 attended   Group Type: OT Clinic   Group Topic Covered: coping skills     Group Session Detail:    Intervention:  Pt participated in a OT Clinic group to facilitate coping skills exploration and creative expression through personally meaningful activities, and to encourage utilization of these healthy coping skills to promote overall health and wellness. Group included clinical observation of social, cognitive and kinesthetic performance skills to inform treatment and safe discharge planning.    Mood/Affect: Pleasant       Plan: Patient encouraged to maintain attendance for continued ongoing support in working towards occupational therapy goals to support overall treatment/care.        Patient Detail:    Was ind in selecting and initiating a new project during this time, requesting additional supplies from writer as needed. Was social off and on during this time with both writer and other peers. Recommending a type of music similar to what writer was playing and spoke of attending several orchestras in the area at times. Demonstrated consistent focus and attention to project during these conversations, demonstrating ability to multi task. Thanked writer for offering group while cleaning up from project.       [address] OT Group (2 or more in attendance)    Patient Active Problem List   Diagnosis    Attention deficit hyperactivity disorder, combined type    Anxiety state    History of vitamin D deficiency    Suicidal ideation    Substance abuse (H)    Bipolar affective disorder, currently depressed, moderate (H)    Other insomnia    Unspecified mood (affective) disorder (H)    Psychosis (H)

## 2024-10-24 NOTE — PLAN OF CARE
Team Note Due:  Wednesday     Assessment/Intervention/Current Symtoms and Care Coordination:  Chart review and met with team, discussed pt progress, symptomology, and response to treatment.  Discussed the discharge plan and any potential impediments to discharge.    In team it was reported that Freddy has been refusing medication stating he has had side effects of insomnia in the past.  LIA accessor interviewed him and reported that he does not think he has a problem and therefore might not be a good fit for IOP or IP.  Met with Freddy about discussed programs and he stated he is only interested in virtual options.      LIA accessor sent referrals for [facility] and [facility].  Both are mental health focused and virtual.       Discharge Plan or Goal:  Referral to CD treatment      Barriers to Discharge:  None     Referral Status:  None      Legal Status:  72 hour hold 10/22/2024 at 4:38 AM.    Contacts (include OMARI status):  Loc [facility] ([phone])       Upcoming Meetings and Dates/Important Information and next steps:  None

## 2024-10-24 NOTE — PROGRESS NOTES
[facility]  [address] [place], [place] [address] [address]      10/24/2024      Freddy DORADO Didi  [address] [facility] [address]      Dear Mr. Tolbert,    Here are some programs to look into.  Since you completed a LIA Comprehensive Assessment with me, you can let me know which program you would like to attend, and I will fax your assessment to the program of choice for you to get started. I will need you to sign a Release of Information before I fax your assessment. This Release of Information can be sent to you via [employer] (electronic) or via the Unit . If you have questions, my phone number is [phone].    Thanks,  Tejal    [facility]: (in-person & virtual)  [address],   [place] [address].  Phone: [phone]  Fax: [phone]  email:  [email or web address]  [email or web address]    [employer]  [address] [place] [address]  Phone: [phone]  fax: [phone]  email: [email or web address]  [email or web address].    [facility]:  Locations: [facility], [place], [facility], and [facility].  Phone: [phone]  Fax:  [phone]  Email: [email or web address]  email: [email or web address]  [email or web address]  *Virtual IOP:  Day program: Monday through Thursday from 9 a - noon.   Evening program: Monday through Thursday from 5 p - 8 p.    [facility] (IOP- Men & Women)  Phone: [phone]  Fax: [phone]  [email or web address]  [address]  [place] [address]  [email or web address]://[email or web address]/  *AM program hours are 9:30am-12:05pm  *PM program hours are 6:30pm-9:05pm, Monday-Thursday.    [facility]   General Admissions  [address] [place] [address]  Phone: [phone]  Fax:  [phone]  Email: [email or web address]  [email or web address]  *IOP: 9-19 hours a week, no  yesenia Vergara MA [facility]  CD Evaluation Counselor  [phone]    (Emailed to pt and CTC today)

## 2024-10-24 NOTE — PROGRESS NOTES
SPIRITUAL HEALTH SERVICES  SPIRITUAL ASSESSMENT progress Note  [facility]     REFERRAL SOURCE: follow-up    Met with Freddy in his room and he requested that I come back tomorrow for a check-in, which I assured him was possible.     PLAN: Will check-in with Freddy tomorrow per request. Spiritual health services remains available for any follow-up or requests. For further visits please place spiritual health consults.    Morenita [facility]  Associate   Pager: [phone]

## 2024-10-24 NOTE — PLAN OF CARE
Night Nursing Note  10/23/24 - 10/24/24          Freddy was in bed at beginning of shift and appeared asleep.  Monitored q15 mins for safety.  Appeared to sleep majority of night without difficulty.  Continue to monitor, offer support and reassurance per are plan.    Slept 6.5 hrs.

## 2024-10-24 NOTE — PLAN OF CARE
"Goal Outcome Evaluation:    Individual Therapy Note    Initial meeting note:    Therapist introduced self to patient and discussed psychotherapy service available to patient.     Pt response: Pt not interested currently in meeting 1:1; therapist will continue remaining available for pt     Plan: Pt was encouraged to attend groups and therapist will remain available for 1:1 sessions       Date of Service: October 24, 2024    Patient: Freddy goes by \"Freddy,\" uses he/him pronouns    Individuals Present: Freddy ABDULAZIZ Oliva    Session start: 11:00 am  Session end: 11:20 am  Session duration in minutes: 20      Modality Used: Person Centered and Rapport Building    Goals: Safety planning    Patient Description of current symptoms: wanting to be around brother who is sober, positive influence     Mental Status Exam:   Attitude: cooperative and guarded  Eye Contact: good  Mood: better  Affect: appropriate and in normal range  Speech: clear, coherent  Psychomotor Behavior: no evidence of tardive dyskinesia, dystonia, or tics  Thought Process:  linear  Associations: no loose associations  Thought Content: passive suicidal ideation present  Insight: fair  Judgement: limited  Attention Span and Concentration: fair    Pt progress: Pt showed minimal insight about substances, did note shame about DUIs, wants life to improve    Treatment Objective(s) Addressed:   The focus of this session was on rapport building, orienting the patient to therapy, safety planning, and assessing safety Writer told CTC when safety planning, discovered he owns a gun. He says he can ask friends to take cabinet keys.    Progress Towards Goals and Assessment of Patient:   Patient is making progress towards treatment goals as evidenced by team is planning to discharge, has minimal insight. Address that he is a gun owner..       Therapeutic Intervention(s):   Provided active listening, unconditional positive regard, and validation.   Engaged in " safety planning identifying coping skills, warning signs, health support and resources, Understand and identify reasons for hospitalization, how to use the hospital to create change and prevent future hospitalizations , Engaged in guided discovery, explored patient's perspectives and helped expand them through socratic dialogue, and Explored motivation for behavioral change      Plan/next step: planning to discharge this week    [address] - Psychotherapy (with patient) - 30 (16-37*) min    Patient Active Problem List   Diagnosis    Attention deficit hyperactivity disorder, combined type    Anxiety state    History of vitamin D deficiency    Suicidal ideation    Substance abuse (H)    Bipolar affective disorder, currently depressed, moderate (H)    Other insomnia    Unspecified mood (affective) disorder (H)    Psychosis (H)

## 2024-10-25 VITALS
OXYGEN SATURATION: 99 % | BODY MASS INDEX: 31 KG/M2 | WEIGHT: 222.3 LBS | SYSTOLIC BLOOD PRESSURE: 128 MMHG | DIASTOLIC BLOOD PRESSURE: 86 MMHG | HEART RATE: 75 BPM | TEMPERATURE: 98.4 F | RESPIRATION RATE: 16 BRPM

## 2024-10-25 PROCEDURE — 99239 HOSP IP/OBS DSCHRG MGMT >30: CPT | Performed by: PSYCHIATRY & NEUROLOGY

## 2024-10-25 RX ORDER — TRAZODONE HYDROCHLORIDE 50 MG/1
50 TABLET, FILM COATED ORAL
Qty: 30 TABLET | Refills: 0 | Status: SHIPPED | OUTPATIENT
Start: 2024-10-25

## 2024-10-25 RX ORDER — HYDROXYZINE HYDROCHLORIDE 25 MG/1
25 TABLET, FILM COATED ORAL EVERY 4 HOURS PRN
Qty: 60 TABLET | Refills: 0 | Status: SHIPPED | OUTPATIENT
Start: 2024-10-25

## 2024-10-25 RX ADMIN — BETAMETHASONE DIPROPIONATE: 0.5 OINTMENT, AUGMENTED TOPICAL at 08:38

## 2024-10-25 ASSESSMENT — ACTIVITIES OF DAILY LIVING (ADL)
ORAL_HYGIENE: INDEPENDENT
ADLS_ACUITY_SCORE: 0
LAUNDRY: WITH SUPERVISION
ADLS_ACUITY_SCORE: 0
DRESS: INDEPENDENT
ADLS_ACUITY_SCORE: 0
ADLS_ACUITY_SCORE: 0
HYGIENE/GROOMING: INDEPENDENT
ADLS_ACUITY_SCORE: 0
ADLS_ACUITY_SCORE: 0

## 2024-10-25 NOTE — PLAN OF CARE
Care Coordinator scheduled the following Primary Care appointment and provided information to self-schedule Individual Therapy:     Primary Care appointment: Tuesday, October 29, 2024 @ 3:55pm (check-in) via Telehealth   Provider: Joe Patel PA-C  Address: [facility], [address] [place] [address] [address] [place] [address]  Phone: [phone]  Note: To access this appointment, log into your [email or web address] account. A notification will also be sent via email ([email or web address]).     Individual Therapy appointment: Please contact the clinic or your therapist directly to schedule or confirm an appointment.   Provider: Teagan Dougherty   Location: [facility], [address] [place] [address] [place] [address]  Phone: [phone]  Text: [phone]  Fax: [phone]     CC updated CTC and AVS per above.

## 2024-10-25 NOTE — DISCHARGE SUMMARY
"Psychiatric Discharge Summary    Freddy Tolbert MRN# [id number]   Age: 33 year old YOB: 1990     Date of Admission:  10/22/2024  Date of Discharge:  10/25/2024  Admitting Physician:  Leighton Arriaga MD  Discharge Physician:  Leighton Arriaga MD (Contact: [phone])         Event Leading to Hospitalization:     Confusion, psychosis in context of illicit drugs use and alcohol withdrawal.     History of present illness: per ED note: Freddy Tolbert presents to the ED by  self. Patient is presenting to the ED for the following concerns: Worsening psychosocial stress, Substance use, Paranoia.   Factors that make the mental health crisis life threatening or complex are:  Pt presents for paranoia about taking unknown medications. Pt was found in [facility] and a code 21 was called due to his escalating behaviors. Pt presents as disoriented, confused, appears to struggle to speak and only responds with one word answers, and paranoid. Pt reports he is confused. Pt reports he believes he is a surgeon who has to operate on a friend for a heart transplant. Pt does not appear to recall events from earlier today, as he was discharged from [facility]. Per chart review, pt presented to ED earlier today for paranoia and believes the [employer] is out to get him and that he needs a . Per chart review, pt reported he had not been sleeping, had used marijuana, alcohol, adderall, fentanyl, and hallucinogens recently. Per chart review, pt was not suicidat and denies hallucinations at that time.      Current Anxiety Symptoms:     Current Depression/Trauma:     Current Somatic Symptoms:  sweating, flushing, shaking, anxious  Current Psychosis/Thought Disturbance:     Current Eating Symptoms:      During visit with this provider: patient went to the conference room willingly and talked to me. Presented as guarded and not very forthcoming with the information. Frequently said: \"I don't remember\" to my " questions about circumstances of his admission. Admitted to not sleeping for 6-7 days and using THC products and drinking alcohol and denied using hallucinogens, stimulants and fentanyl despite earlier admitting using it (UDS was positive for THC only). Reported stopping drinking alcohol about one week ago, experiencing Auditory hallucinations of voices repeating sentences or talking about him. Suggested that some of his symptoms might be because of coexisting tinnitus. Denied feeling paranoid and said that he didn't recall how he signed himself out of [facility] and, then, was found at [facility] confused and disoriented. Said that it was suggested to him that he might have Bipolar affective disorder, but said that he stopped taking all meds because of preference to take only natural substances. Could not say with certainty whether he had major MI symptoms while being sober and not going through withdrawal. Voiced a lot of reluctance to start taking meds, said that he would, rather, take medicinal marijuana, but, eventually, agreed to restart Zyprexa. Most common side effects were discussed with the patient to the patient's satisfaction. Freddy showed very little interest in going to a formal CD treatment, even, after I reminded him about his recent psych hospitalization with symptoms of psychosis also seemed to be triggered by substance use, alcoholic hepatitis on this admission and 3 DWIs. He said that he would not go to residential CD treatment, but might consider going to outpatient program if it is not too far from his residence. Agreed to meet with CD counselor.          See Admission note by by admitting physician/nurse-practitioner Leighton Arriaga MD for additional details.          DIagnoses:     -Substance induced mood disorder with psychotic features.  -Bipolar disorder, unspecified.   -Alcohol use disorder.   -Marijuana use disorder.   -History of ADHD, depression.        Labs:      Latest  Reference Range & Units Most Recent 10/22/24 03:34 10/22/24 08:50 10/23/24 08:33 10/23/24 15:30   Sodium 135 - 145 mmol/L 138  10/23/24 08:33 136  138    Potassium 3.4 - 5.3 mmol/L 3.8  10/23/24 08:33 3.3 (L) 3.5 3.8    Chloride 98 - 107 mmol/L 98  10/23/24 08:33 97 (L)  98    Carbon Dioxide (CO2) 22 - 29 mmol/L 31 (H)  10/23/24 08:33 26  31 (H)    Urea Nitrogen 6.0 - 20.0 mg/dL 10.2  10/23/24 08:33 14.6  10.2    Creatinine 0.67 - 1.17 mg/dL 0.76  10/23/24 08:33 0.78  0.76    GFR Estimate >60 mL/min/1.73m2 >90  10/23/24 08:33 >90  >90    Calcium 8.8 - 10.4 mg/dL 10.0  10/23/24 08:33 10.1  10.0    Anion Gap 7 - 15 mmol/L 9  10/23/24 08:33 13  9    Magnesium 1.7 - 2.3 mg/dL 1.9  10/21/24 00:52       Albumin 3.5 - 5.2 g/dL 4.4  10/23/24 08:33   4.4    Protein Total 6.4 - 8.3 g/dL 7.9  10/23/24 08:33   7.9    Alkaline Phosphatase 40 - 150 U/L 155 (H)  10/23/24 08:33   155 (H)    ALT 0 - 70 U/L 125 (H)  10/23/24 08:33   125 (H)    AST 0 - 45 U/L 102 (H)  10/23/24 08:33   102 (H)    Bilirubin Total <=1.2 mg/dL 2.2 (H)  10/23/24 08:33   2.2 (H)    Cholesterol <200 mg/dL 239 (H)  10/23/24 08:33   239 (H)    Glucose 70 - 99 mg/dL 86  10/23/24 08:33 103 (H)  86    HDL Cholesterol >=40 mg/dL 56  10/23/24 08:33   56    Estimated Average Glucose <117 mg/dL 91  10/23/24 08:33   91    Hemoglobin A1C <5.7 % 4.8  10/23/24 08:33   4.8    LDL Cholesterol Calculated <100 mg/dL 166 (H)  10/23/24 08:33   166 (H)    Non HDL Cholesterol <130 mg/dL 183 (H)  10/23/24 08:33   183 (H)    Triglycerides <150 mg/dL 87  10/23/24 08:33   87    TSH 0.30 - 4.20 uIU/mL 1.45  10/23/24 08:33   1.45    WBC 4.0 - 11.0 10e3/uL 11.8 (H)  10/21/24 00:52       Hemoglobin 13.3 - 17.7 g/dL 14.6  10/21/24 00:52       Hematocrit 40.0 - 53.0 % 42.0  10/21/24 00:52       Platelet Count 150 - 450 10e3/uL 205  10/21/24 00:52       RBC Count 4.40 - 5.90 10e6/uL 4.57  10/21/24 00:52       MCV 78 - 100 fL 92  10/21/24 00:52       MCH 26.5 - 33.0 pg 31.9  10/21/24 00:52        MCHC 31.5 - 36.5 g/dL 34.8  10/21/24 00:52       RDW 10.0 - 15.0 % 13.0  10/21/24 00:52       % Neutrophils % 65  10/21/24 00:52       % Lymphocytes % 22  10/21/24 00:52       % Monocytes % 11  10/21/24 00:52       % Eosinophils % 2  10/21/24 00:52       % Basophils % 0  10/21/24 00:52       Absolute Basophils 0.0 - 0.2 10e3/uL 0.0  10/21/24 00:52       Absolute Eosinophils 0.0 - 0.7 10e3/uL 0.2  10/21/24 00:52       Absolute Immature Granulocytes <=0.4 10e3/uL 0.1  10/21/24 00:52       Absolute Lymphocytes 0.8 - 5.3 10e3/uL 2.6  10/21/24 00:52       Absolute Monocytes 0.0 - 1.3 10e3/uL 1.3  10/21/24 00:52       % Immature Granulocytes % 1  10/21/24 00:52       Absolute Neutrophils 1.6 - 8.3 10e3/uL 7.6  10/21/24 00:52       Absolute NRBCs 10e3/uL 0.0  10/21/24 00:52       NRBCs per 100 WBC <1 /100 0  10/21/24 00:52       HIV Antigen Antibody Combo Nonreactive  Nonreactive  10/23/24 15:30    Nonreactive   Salicylate mg/dL <0.3  10/21/24 00:52       Amphetamine Qual Urine Screen Negative  Screen Negative  10/21/24 01:44       Fentanyl Qual Urine Screen Negative  Screen Negative  10/21/24 01:44       Cocaine Urine Screen Negative  Screen Negative  10/21/24 01:44       Benzodiazepine Urine Screen Negative  Screen Negative  10/21/24 01:44       Opiates Qualitative Urine Screen Negative  Screen Negative  10/21/24 01:44       PCP Urine Screen Negative  Screen Negative  10/21/24 01:44       Cannabinoids Qual Urine Screen Negative  Screen Positive !  10/21/24 01:44       Barbiturates Qual Urine Screen Negative  Screen Negative  10/21/24 01:44       EKG 12-LEAD, TRACING ONLY  Rpt (C)  10/21/24 01:12       Alcohol ethyl <=0.01 g/dL <0.01  10/21/24 00:52       Acetaminophen 10.0 - 30.0 ug/mL <5.0 (L)  10/21/24 00:52       (L): Data is abnormally low  (H): Data is abnormally high  !: Data is abnormal  (C): Corrected  Rpt: View report in Results Review for more information    Sinus tachycardia  Otherwise normal ECG  No  "previous ECGs available  Confirmed by GENERATED REPORT, COMPUTER ([id number]),  MARTHA CHENG ([id number]) on 10/22/2024 4:04:49 PM          Consults:   Consultation during this admission received from CD counselor. Per CD counselor patient was ambivalent about going to CD treatment, minimizing impact of alcohol on his life, appreciate CD counselor's advice. See recommendations below:    \"1)  Complete an Intensive Outpatient CD Treatment Program.  2)  Abstain from all mood-altering chemicals unless prescribed by a licensed provider.   3)  Attend, at minimum, 2 weekly support group meetings, such as 12 step based (AA/NA), SMART Recovery, Health Realizations, and/or Refuge Recovery meetings.     4)  Actively work with a male mentor/sponsor on a weekly basis.   5)  Follow all the recommendations of your treatment/medical providers.\"         Hospital Course:     Freddy Tolbert was admitted to [facility] with attending Leighton Arriaga MD on a 72 hour mental health hold. The patient was placed under status 15 (15 minute checks) to ensure patient safety. Freddy presented as superficially polite, very guarded and clearly minimizing impact of alcohol and other substances on his life. He by the moment of our first meeting appeared to have no symptoms of jj, psychosis, was clear and oriented x 3. Still, he replied to many questions about circumstances of his admission: \"I don't remember\". We had number of conversations about his elevated liver enzymes likely caused by excessive alcohol use. Freddy didn't appear to be impressed, said that those would go back to normal if he stops drinking: \"I don't want to continue to drink\". I reminded him that he by now had 3 DWIs and has no driving license, was admitted to this hospital only few months ago with similar psychotic symptoms in context of withdrawing from alcohol and using street drugs. Freddy didn't appear to be too receptive. He declined suggestion to use " antipsychotics such as Risperdal, Haldol. He initially agreed to restart Zyprexa, then, declined to take it and Depakote (both were PTA meds) on grounds that Zyprexa caused insomnia?! He insisted that he would prefer not to take any meds and be treated naturally and asked for a medicinal marijuana. Per patient's request we tested him for HIV and Vit D deficiency. On the day of discharge Freddy was seen in presence of [facility] during phone conference with his mother. Freddy initially said that he would prefer to stay for one more day and leave tomorrow, but agreed with us when we told him that in light of him not taking any meds, not having symptoms, refusing to go to CD treatment we would have no grounds to keep him at this hospital. He said that he would go to his mother's place. Mother confirmed that she would come and get Freddy. She promised to go to Freddy's place and remove his hand gun and Freddy promised to give mother his keys. By the end of our visit Freddy said that he could participate in virtual outpatient CD program. He denied presence of Suicidal ideation and had no questions or concerns.      Freddy Tolbert did participate in groups and was visible in the milieu.     The patient's symptoms of psychosis and depression improved.     Freddy Tolbert was released to  his mother . At the time of discharge Freddy Tolbert was determined to not be a danger to himself or others.          Discharge Medications:     Discharge Medication List as of 10/25/2024 12:26 PM        START taking these medications    Details   hydrOXYzine HCl (ATARAX) 25 MG tablet Take 1 tablet (25 mg) by mouth every 4 hours as needed for anxiety., Disp-60 tablet, R-0, E-Prescribe      traZODone (DESYREL) 50 MG tablet Take 1 tablet (50 mg) by mouth nightly as needed for sleep (may repeat after 60 minutes)., Disp-30 tablet, R-0, E-Prescribe           CONTINUE these medications which have NOT CHANGED    Details   albuterol (PROAIR  HFA/PROVENTIL HFA/VENTOLIN HFA) 108 (90 Base) MCG/ACT inhaler Inhale 2 puffs into the lungs every 6 hours., Disp-18 g, R-0, E-PrescribePharmacy may dispense brand covered by insurance (Proair, or proventil or ventolin or generic albuterol inhaler)      aluminum chloride (DRYSOL) 20 % external solution Apply to affected areas once weeklyHistorical      augmented betamethasone dipropionate (DIPROLENE-AF) 0.05 % external ointment Apply topically 2 times daily.Historical      triamcinolone (KENALOG) 0.1 % external ointment Apply topically to the affected area 1 to 2 times daily as needed for eczemaHistorical           STOP taking these medications       ALPRAZolam (XANAX) 1 MG tablet Comments:   Reason for Stopping:         amphetamine-dextroamphetamine (ADDERALL XR) 20 MG 24 hr capsule Comments:   Reason for Stopping:         amphetamine-dextroamphetamine (ADDERALL) 5 MG tablet Comments:   Reason for Stopping:                    Psychiatric Examination:   Appearance:  awake, alert and dressed in hospital scrubs  Attitude:  cooperative  Eye Contact:  fair  Mood:  anxious and better  Affect:  constricted mobility  Speech:  clear, coherent  Psychomotor Behavior:  no evidence of tardive dyskinesia, dystonia, or tics and intact station, gait and muscle tone  Thought Process:  linear and goal oriented  Associations:  no loose associations  Thought Content:  no evidence of suicidal ideation or homicidal ideation and no evidence of psychotic thought  Insight:  limited  Judgment:  limited  Oriented to:  time, person, and place  Attention Span and Concentration:  intact  Recent and Remote Memory:  fair  Language: Able to name objects, Able to repeat phrases, and Able to read and write  Fund of Knowledge: appropriate  Muscle Strength and Tone: normal  Gait and Station: Normal         Discharge Plan:       Individual Therapy appointment: Please contact the clinic or your therapist directly to schedule or confirm an appointment.    Provider: Teagan Dougherty   Location: [facility], [address] [place] [id number]  Phone: [phone]  Text: [phone]  Fax: [phone]      Referrals made for chemical dependency day treatment (virtual).  They will either call or email you for an intake appointment.      [facility]   Phone: [phone]   Address:  [address], [place] [address]  [email or web address]     [facility]   [phone]  Address:[address], [place] [address]   [address]  [email or web address]            Attestation:  The patient has been seen and evaluated by me,  Leighton Arriaga MD     Total time spent was 47 minutes. Over 50% of times was spent counseling and coordination of care regarding coping skills, medication and discharge planning.

## 2024-10-25 NOTE — PLAN OF CARE
"   05/26/20 2155   Behavioral Health   Hallucinations denies / not responding to hallucinations   Thinking intact   Orientation person: oriented;place: oriented;date: oriented;time: oriented   Memory baseline memory   Insight poor   Judgement impaired   Eye Contact at examiner   Affect blunted, flat   Mood mood is calm   Physical Appearance/Attire appears stated age   Hygiene well groomed   Suicidality   (denies)   1. Wish to be Dead (Recent) No   2. Non-Specific Active Suicidal Thoughts (Recent) No   Self Injury   (denies)   Elopement   (none observed)   Activity isolative;withdrawn   Speech clear;coherent   Psychomotor / Gait balanced;steady   Activities of Daily Living   Hygiene/Grooming independent   Oral Hygiene independent   Dress scrubs (behavioral health)   Room Organization independent       Patient did not require seclusion/restraints to manage behavior.    Ashelyottoniel CARL Johnson did not participate in groups and was occasionally visible in the milieu.    Notable mental health symptoms during this shift:decreased energy    Patient is working on these coping/social skills: Sharing feelings  Distraction  Positive social behaviors  Breathing exercises   Asking for help  Avoiding engaging in negative behavior of others  Reaching out to family  Asking for medications when needed    Roderick said he \"wants to go home\" when asked how he's feeling about the unit. Patient denies anxiety, depression, SI, SIB, and wishing to be dead. When asked why he's here Roderick said \"because I tried to kill myself. Patient did not care to elaborate and said \"I don't know\" when asked what his stressors were that led to this.  " BEH IP Unit Acuity Rating Score (UARS)  Patient is given one point for every criteria they meet.    CRITERIA SCORING   On a 72 hour hold, court hold, committed, stay of commitment, or revocation. 0    Patient LOS on BEH unit exceeds 20 days. 0  LOS: 3   Patient under guardianship, 55+, otherwise medically complex, or under age 11. 0   Suicide ideation without relief of precipitating factors. 0   Current plan for suicide. 0   Current plan for homicide. 0   Imminent risk or actual attempt to seriously harm another without relief of factors precipitating the attempt. 0   Severe dysfunction in daily living (ex: complete neglect for self care, extreme disruption in vegetative function, extreme deterioration in social interactions). 0   Recent (last 7 days) or current physical aggression in the ED or on unit. 0   Restraints or seclusion episode in past 72 hours. 0   Recent (last 7 days) or current verbal aggression, agitation, yelling, etc., while in the ED or unit. 0   Active psychosis. 0   Need for constant or near constant redirection (from leaving, from others, etc).  0   Intrusive or disruptive behaviors. 0   Patient requires 3 or more hours of individualized nursing care per 8-hour shift (i.e. for ADLs, meds, therapeutic interventions). 0   TOTAL 1        [employer] dose 1 and 2

## 2024-10-25 NOTE — PLAN OF CARE
Pt. States ready for discharge and is requesting discharge against medical advice.  Pt. Reports no suicidal ideation, self-injurious behavior.  Pt. Reports that his thoughts are clear, reality based. Pt. States that he understands his therapeutic discharge plan and his medication regime and has no concerns or questions.  Pt. States that he will follow his medication regime and his therapeutic discharge plan.

## 2024-10-25 NOTE — PLAN OF CARE
Team Note Due:  Wednesday     Assessment/Intervention/Current Symtoms and Care Coordination:  Chart review and met with team, discussed pt progress, symptomology, and response to treatment.  Discussed the discharge plan and any potential impediments to discharge.    Dr. Arriaga and this writer met with Freddy today to discuss discharge plans.  Called Mom and spoke with her together requesting to remove gun from Freddy's apartment.  Mom confirmed that she would.  Mom also confirmed that she would pick him up and take him to their house for a few days.  Freddy has referrals set up for [facility] and [facility].  Both programs have his phone number and email to contact him directly.      Discharge Plan or Goal:  Referral to CD treatment   Discharge home with family     Barriers to Discharge:  None     Referral Status:  None      Legal Status:  72 hour hold 10/22/2024 at 4:38 AM.    Contacts (include OMARI status):  Loc Tolbert ([phone])       Upcoming Meetings and Dates/Important Information and next steps:  None

## 2024-10-26 LAB
DEPRECATED CALCIDIOL+CALCIFEROL SERPL-MC: <29 UG/L (ref 20–75)
VITAMIN D2 SERPL-MCNC: <5 UG/L
VITAMIN D3 SERPL-MCNC: 24 UG/L

## 2024-10-28 DIAGNOSIS — Z09 HOSPITAL DISCHARGE FOLLOW-UP: ICD-10-CM

## 2024-10-29 ENCOUNTER — PATIENT OUTREACH (OUTPATIENT)
Dept: CARE COORDINATION | Facility: CLINIC | Age: 34
End: 2024-10-29

## 2024-10-29 ENCOUNTER — TELEPHONE (OUTPATIENT)
Dept: PHARMACY | Facility: OTHER | Age: 34
End: 2024-10-29

## 2024-10-29 NOTE — PROGRESS NOTES
[facility] [place]: Transitions of Care Outreach  Chief Complaint   Patient presents with    Clinic Care Coordination - Post Hospital       Most Recent Admission Date: 10/22/2024   Most Recent Admission Diagnosis:      Most Recent Discharge Date: 10/25/2024   Most Recent Discharge Diagnosis: Anxiety state - F41.1  Other insomnia - G47.09     Transitions of Care Assessment    Discharge Assessment  How are you doing now that you are home?: Writer spoke to patient who reports doing well. He is well rested and will be making follow up appointments, he hasn't yet. No questions or concerns.  How are your symptoms? (Red Flag symptoms escalate to triage hotline per guidelines): Improved  Do you know how to contact your clinic care team if you have future questions or changes to your health status? : Yes  Does the patient have their discharge instructions? : Yes  Does the patient have questions regarding their discharge instructions? : No  Were you started on any new medications or were there changes to any of your previous medications? : Yes  Does the patient have all of their medications?: Yes  Do you have questions regarding any of your medications? : No  Do you have all of your needed medical supplies or equipment (DME)?  (i.e. oxygen tank, CPAP, cane, etc.): Yes                Follow up Plan   Individual Therapy appointment: Please contact the clinic or your therapist directly to schedule or confirm an appointment.   Provider: Teagan Dougherty   Location: [address] [address]  Phone: [phone]  Text: [phone]  Fax: [phone]      Referrals made for chemical dependency day treatment (virtual).  They will either call or email you for an intake appointment.      [facility]   Phone: [phone]   Address:  [address], [place] [address]  [email or web address]     [facility]   [phone]  Address:[address], [place]  [address]   [address]  [email or web address]     Discharge Follow-Up  Discharge follow up appointment scheduled in alignment with recommended follow up timeframe or Transitions of Risk Category? (Low = within 30 days; Moderate= within 14 days; High= within 7 days): No  Patient's follow up appointment not scheduled: Patient declined scheduling support. Education on the importance of transitions of care follow up. Provided scheduling phone number.    No future appointments.    Outpatient Plan as outlined on AVS reviewed with patient.    For any urgent concerns, please contact our 24 hour nurse triage line: [phone] ([phone])       JAIME Castillo (she/her/hers)  Social Work Clinic Care Coordinator   [facility]  [email or web address]  [phone]

## 2024-10-29 NOTE — TELEPHONE ENCOUNTER
MTM referral from: Transitions of Care (recent hospital discharge, TCU discharge, or ED visit)    MTM referral outreach attempt #1 on October 29, 2024 at 8:53 AM      Outcome: Spoke with patient declined    Use AMALIA Davey pathfinder, psych RAN for the carrier/Plan on the flowsheet          Loree Sorenson [facility]  -[facility]  [phone]

## 2025-01-21 ENCOUNTER — MYC MEDICAL ADVICE (OUTPATIENT)
Dept: FAMILY MEDICINE | Facility: CLINIC | Age: 35
End: 2025-01-21

## 2025-01-21 DIAGNOSIS — F41.1 ANXIETY STATE: ICD-10-CM

## 2025-01-21 DIAGNOSIS — G47.09 OTHER INSOMNIA: ICD-10-CM

## 2025-01-23 NOTE — TELEPHONE ENCOUNTER
Patient calling in to follow up about medications.     He states he lost insurance coverage but now has this back and is wanting to discuss PCP taking over managing Hydroxyzine and Trazodone as well as restarting Adderral.     Patient states next available appointment for virtual is not until 2/3/25 and is really hoping to see you sooner to discuss this.       Would you be ok using same day slot 1/28/25 at 10 am and changing it to virtual? Patient requesting telephone appointment.     Please advise.     RN did put hold on 1/28/25 same day appointment slot while awaiting for PCP review.      Joyce Prieto RN on 1/23/2025 at 1:16 PM

## 2025-01-24 RX ORDER — TRAZODONE HYDROCHLORIDE 50 MG/1
50 TABLET, FILM COATED ORAL
Qty: 30 TABLET | Refills: 0 | Status: SHIPPED | OUTPATIENT
Start: 2025-01-24

## 2025-01-24 RX ORDER — HYDROXYZINE HYDROCHLORIDE 25 MG/1
25 TABLET, FILM COATED ORAL EVERY 4 HOURS PRN
Qty: 60 TABLET | Refills: 0 | Status: SHIPPED | OUTPATIENT
Start: 2025-01-24

## 2025-01-24 NOTE — TELEPHONE ENCOUNTER
Patient Is scheduled.      Kristen Valdez  Lead    [facility] Jerri Davey

## 2025-01-30 ENCOUNTER — OFFICE VISIT (OUTPATIENT)
Dept: FAMILY MEDICINE | Facility: CLINIC | Age: 35
End: 2025-01-30
Payer: MEDICAID

## 2025-01-30 VITALS
RESPIRATION RATE: 20 BRPM | TEMPERATURE: 98.1 F | HEART RATE: 94 BPM | HEIGHT: 70 IN | OXYGEN SATURATION: 95 % | WEIGHT: 249.6 LBS | DIASTOLIC BLOOD PRESSURE: 86 MMHG | SYSTOLIC BLOOD PRESSURE: 122 MMHG | BODY MASS INDEX: 35.73 KG/M2

## 2025-01-30 DIAGNOSIS — F90.2 ATTENTION DEFICIT HYPERACTIVITY DISORDER, COMBINED TYPE: ICD-10-CM

## 2025-01-30 DIAGNOSIS — R45.86 VARIABLE MOOD: ICD-10-CM

## 2025-01-30 DIAGNOSIS — G47.09 OTHER INSOMNIA: ICD-10-CM

## 2025-01-30 DIAGNOSIS — R79.89 ELEVATED LFTS: ICD-10-CM

## 2025-01-30 DIAGNOSIS — F41.1 GENERALIZED ANXIETY DISORDER: Primary | ICD-10-CM

## 2025-01-30 DIAGNOSIS — F51.8 ABNORMAL DREAMS: ICD-10-CM

## 2025-01-30 LAB
AMPHETAMINES UR QL SCN: ABNORMAL
BARBITURATES UR QL SCN: ABNORMAL
BENZODIAZ UR QL SCN: ABNORMAL
BZE UR QL SCN: ABNORMAL
CANNABINOIDS UR QL SCN: ABNORMAL
FENTANYL UR QL: ABNORMAL
OPIATES UR QL SCN: ABNORMAL
PCP QUAL URINE (ROCHE): ABNORMAL

## 2025-01-30 RX ORDER — LAMOTRIGINE 25 MG/1
TABLET ORAL
Qty: 180 TABLET | Refills: 1 | Status: SHIPPED | OUTPATIENT
Start: 2025-01-30

## 2025-01-30 RX ORDER — DEXTROAMPHETAMINE SACCHARATE, AMPHETAMINE ASPARTATE MONOHYDRATE, DEXTROAMPHETAMINE SULFATE AND AMPHETAMINE SULFATE 7.5; 7.5; 7.5; 7.5 MG/1; MG/1; MG/1; MG/1
30 CAPSULE, EXTENDED RELEASE ORAL EVERY MORNING
Qty: 30 CAPSULE | Refills: 0 | Status: SHIPPED | OUTPATIENT
Start: 2025-01-30

## 2025-01-30 ASSESSMENT — ASTHMA QUESTIONNAIRES
ACT_TOTALSCORE: 22
QUESTION_3 LAST FOUR WEEKS HOW OFTEN DID YOUR ASTHMA SYMPTOMS (WHEEZING, COUGHING, SHORTNESS OF BREATH, CHEST TIGHTNESS OR PAIN) WAKE YOU UP AT NIGHT OR EARLIER THAN USUAL IN THE MORNING: ONCE OR TWICE
QUESTION_5 LAST FOUR WEEKS HOW WOULD YOU RATE YOUR ASTHMA CONTROL: COMPLETELY CONTROLLED
QUESTION_2 LAST FOUR WEEKS HOW OFTEN HAVE YOU HAD SHORTNESS OF BREATH: NOT AT ALL
QUESTION_4 LAST FOUR WEEKS HOW OFTEN HAVE YOU USED YOUR RESCUE INHALER OR NEBULIZER MEDICATION (SUCH AS ALBUTEROL): TWO OR THREE TIMES PER WEEK
QUESTION_1 LAST FOUR WEEKS HOW MUCH OF THE TIME DID YOUR ASTHMA KEEP YOU FROM GETTING AS MUCH DONE AT WORK, SCHOOL OR AT HOME: NONE OF THE TIME
ACT_TOTALSCORE: 22

## 2025-01-30 NOTE — PROGRESS NOTES
Jackie Hayes is a 34 year old, presenting for the following health issues:  A.D.H.D (Recheck, restart medication) and Health Maintenance (Patient declined vaccines today)      1/30/2025    10:10 AM   Additional Questions   Roomed by Shannon Pederson CMA   Accompanied by None         1/30/2025    10:10 AM   Patient Reported Additional Medications   Patient reports taking the following new medications none     A.D.H.D    History of Present Illness       Reason for visit:  Med-check appointment    He eats 2-3 servings of fruits and vegetables daily.He consumes 0 sweetened beverage(s) daily.He exercises with enough effort to increase his heart rate 30 to 60 minutes per day.  He exercises with enough effort to increase his heart rate 4 days per week.   He is taking medications regularly.  Prior to hospitalization did not sleep for 7 days.   Insomnia.  History of bipolar disorder and anxiety.  No suicidal.   Hydroxyzine and trazodone proved mildly helpful.  Sleeping ok now with out medication.   Hasn't  drank etoh in 2 mos.  Anxiety is now stable.   Maybe some mild depression.   Denies using marijuana routinely  History of abnormal and vivid dreams. Sleep walking as well.   AA meetings are being attended   Review of Systems  Constitutional, HEENT, cardiovascular, pulmonary, GI, , musculoskeletal, neuro, skin, endocrine and psych systems are negative, except as otherwise noted.      Objective    There were no vitals taken for this visit.  There is no height or weight on file to calculate BMI.  Physical Exam               Signed Electronically by: Joe Patel PA-C

## 2025-01-30 NOTE — LETTER
My Asthma Action Plan    Name: Freddy Tolbert   YOB: 1990  Date: 1/30/2025   My doctor: Joe Patel PA-C   My clinic: [facility] [place]        My Rescue Medicine:   Albuterol inhaler (Proair/Ventolin/Proventil HFA)  2-4 puffs EVERY 4 HOURS as needed. Use a spacer if recommended by your provider.   My Asthma Severity:   Intermittent / Exercise Induced  Know your asthma triggers:              GREEN ZONE   Good Control  I feel good  No cough or wheeze  Can work, sleep and play without asthma symptoms       Take your asthma control medicine every day.     If exercise triggers your asthma, take your rescue medication  15 minutes before exercise or sports, and  During exercise if you have asthma symptoms  Spacer to use with inhaler: If you have a spacer, make sure to use it with your inhaler             YELLOW ZONE Getting Worse  I have ANY of these:  I do not feel good  Cough or wheeze  Chest feels tight  Wake up at night   Keep taking your Green Zone medications  Start taking your rescue medicine:  every 20 minutes for up to 1 hour. Then every 4 hours for 24-48 hours.  If you stay in the Yellow Zone for more than 12-24 hours, contact your doctor.  If you do not return to the Green Zone in 12-24 hours or you get worse, start taking your oral steroid medicine if prescribed by your provider.           RED ZONE Medical Alert - Get Help  I have ANY of these:  I feel awful  Medicine is not helping  Breathing getting harder  Trouble walking or talking  Nose opens wide to breathe       Take your rescue medicine NOW  If your provider has prescribed an oral steroid medicine, start taking it NOW  Call your doctor NOW  If you are still in the Red Zone after 20 minutes and you have not reached your doctor:  Take your rescue medicine again and  Call 911 or go to the emergency room right away    See your regular doctor within 2 weeks of an Emergency Room or Urgent Care visit for follow-up  treatment.          Annual Reminders:  Meet with Asthma Educator,  Flu Shot in the Fall, consider Pneumonia Vaccination for patients with asthma (aged 19 and older).    Pharmacy:    [id number] - [place], [place] - [address] [id number] - [place] - [address] AT [facility]    Electronically signed by Joe Patel PA-C   Date: 01/30/25                    Asthma Triggers  How To Control Things That Make Your Asthma Worse    Triggers are things that make your asthma worse.  Look at the list below to help you find your triggers and   what you can do about them. You can help prevent asthma flare-ups by staying away from your triggers.      Trigger                                                          What you can do   Cigarette Smoke  Tobacco smoke can make asthma worse. Do not allow smoking in your home, car or around you.  Be sure no one smokes at a child s day care or school.  If you smoke, ask your health care provider for ways to help you quit.  Ask family members to quit too.  Ask your health care provider for a referral to Quit Plan to help you quit smoking, or call 9-091-914-PLAN.     Colds, Flu, Bronchitis  These are common triggers of asthma. Wash your hands often.  Don t touch your eyes, nose or mouth.  Get a flu shot every year.     Dust Mites  These are tiny bugs that live in cloth or carpet. They are too small to see. Wash sheets and blankets in hot water every week.   Encase pillows and mattress in dust mite proof covers.  Avoid having carpet if you can. If you have carpet, vacuum weekly.   Use a dust mask and HEPA vacuum.   Pollen and Outdoor Mold  Some people are allergic to trees, grass, or weed pollen, or molds. Try to keep your windows closed.  Limit time out doors when pollen count is high.   Ask you health care provider about taking medicine during allergy season.     Animal  Dander  Some people are allergic to skin flakes, urine or saliva from pets with fur or feathers. Keep pets with fur or feathers out of your home.    If you can t keep the pet outdoors, then keep the pet out of your bedroom.  Keep the bedroom door closed.  Keep pets off cloth furniture and away from stuffed toys.     Mice, Rats, and Cockroaches  Some people are allergic to the waste from these pests.   Cover food and garbage.  Clean up spills and food crumbs.  Store grease in the refrigerator.   Keep food out of the bedroom.   Indoor Mold  This can be a trigger if your home has high moisture. Fix leaking faucets, pipes, or other sources of water.   Clean moldy surfaces.  Dehumidify basement if it is damp and smelly.   Smoke, Strong Odors, and Sprays  These can reduce air quality. Stay away from strong odors and sprays, such as perfume, powder, hair spray, paints, smoke incense, paint, cleaning products, candles and new carpet.   Exercise or Sports  Some people with asthma have this trigger. Be active!  Ask your doctor about taking medicine before sports or exercise to prevent symptoms.    Warm up for 5-10 minutes before and after sports or exercise.     Other Triggers of Asthma  Cold air:  Cover your nose and mouth with a scarf.  Sometimes laughing or crying can be a trigger.  Some medicines and food can trigger asthma.

## 2025-01-31 ENCOUNTER — TELEPHONE (OUTPATIENT)
Dept: FAMILY MEDICINE | Facility: CLINIC | Age: 35
End: 2025-01-31
Payer: MEDICAID

## 2025-01-31 DIAGNOSIS — F90.2 ATTENTION DEFICIT HYPERACTIVITY DISORDER, COMBINED TYPE: ICD-10-CM

## 2025-01-31 NOTE — TELEPHONE ENCOUNTER
Routing to TEAM    Does patient need it to be the name brand for this med?    Virgen JAMA, BSN  Wyarno Triage RN  [facility]

## 2025-01-31 NOTE — TELEPHONE ENCOUNTER
Pharmacy fax:  Generic unavailable.     Can't change to brand without new script for CHELSEY- brand medically necessary per insurance.  Please send script with CHEL[facility] for adderal xr 30 mg      amphetamine-dextroamphetamine (ADDERALL XR) 30 MG 24 hr capsule          Patient stated that the pharmacy has other doses available if PCP wanted to do a 10mg and 20mg.    Virgen Hutchins RN on 1/31/2025 at 3:03 PM

## 2025-01-31 NOTE — TELEPHONE ENCOUNTER
Pharmacy fax:  Generic unavailable.    Can't change to brand without new script for CHELSEY- brand medically necessary per insurance.  Please send script with CHELSEY for adderal xr 30 mg     amphetamine-dextroamphetamine (ADDERALL XR) 30 MG 24 hr capsule 30 capsule 0 1/30/2025

## 2025-02-03 RX ORDER — DEXTROAMPHETAMINE SULFATE, DEXTROAMPHETAMINE SACCHARATE, AMPHETAMINE SULFATE AND AMPHETAMINE ASPARTATE 7.5; 7.5; 7.5; 7.5 MG/1; MG/1; MG/1; MG/1
30 CAPSULE, EXTENDED RELEASE ORAL DAILY
Qty: 30 CAPSULE | Refills: 0 | Status: SHIPPED | OUTPATIENT
Start: 2025-02-03

## 2025-02-03 NOTE — TELEPHONE ENCOUNTER
RN called patient and relayed providers message. Patient verbalized understanding.       Joyce Prieto RN on 2/3/2025 at 8:40 AM

## 2025-03-03 ASSESSMENT — PATIENT HEALTH QUESTIONNAIRE - PHQ9
10. IF YOU CHECKED OFF ANY PROBLEMS, HOW DIFFICULT HAVE THESE PROBLEMS MADE IT FOR YOU TO DO YOUR WORK, TAKE CARE OF THINGS AT HOME, OR GET ALONG WITH OTHER PEOPLE: SOMEWHAT DIFFICULT
SUM OF ALL RESPONSES TO PHQ QUESTIONS 1-9: 5
SUM OF ALL RESPONSES TO PHQ QUESTIONS 1-9: 5

## 2025-03-04 ENCOUNTER — VIRTUAL VISIT (OUTPATIENT)
Dept: FAMILY MEDICINE | Facility: CLINIC | Age: 35
End: 2025-03-04
Payer: COMMERCIAL

## 2025-03-04 DIAGNOSIS — F90.2 ATTENTION DEFICIT HYPERACTIVITY DISORDER, COMBINED TYPE: ICD-10-CM

## 2025-03-04 DIAGNOSIS — F31.32 BIPOLAR AFFECTIVE DISORDER, CURRENTLY DEPRESSED, MODERATE (H): Primary | ICD-10-CM

## 2025-03-04 PROBLEM — R45.851 SUICIDAL IDEATION: Status: RESOLVED | Noted: 2024-10-21 | Resolved: 2025-03-04

## 2025-03-04 RX ORDER — ZIPRASIDONE HYDROCHLORIDE 20 MG/1
20 CAPSULE ORAL 2 TIMES DAILY WITH MEALS
Qty: 180 CAPSULE | Refills: 1 | Status: SHIPPED | OUTPATIENT
Start: 2025-03-04

## 2025-03-04 RX ORDER — DEXTROAMPHETAMINE SACCHARATE, AMPHETAMINE ASPARTATE, DEXTROAMPHETAMINE SULFATE AND AMPHETAMINE SULFATE 2.5; 2.5; 2.5; 2.5 MG/1; MG/1; MG/1; MG/1
TABLET ORAL
Qty: 30 TABLET | Refills: 0 | Status: SHIPPED | OUTPATIENT
Start: 2025-03-04

## 2025-03-04 RX ORDER — DEXTROAMPHETAMINE SACCHARATE, AMPHETAMINE ASPARTATE MONOHYDRATE, DEXTROAMPHETAMINE SULFATE AND AMPHETAMINE SULFATE 7.5; 7.5; 7.5; 7.5 MG/1; MG/1; MG/1; MG/1
30 CAPSULE, EXTENDED RELEASE ORAL EVERY MORNING
Qty: 30 CAPSULE | Refills: 0 | Status: SHIPPED | OUTPATIENT
Start: 2025-03-04

## 2025-03-04 NOTE — PROGRESS NOTES
"Freddy is a 34 year old who is being evaluated via a billable video visit.    How would you like to obtain your AVS? MyChart  If the video visit is dropped, the invitation should be resent by: Text to cell phone: [phone]  Will anyone else be joining your video visit? No      Assessment & Plan   Problem List Items Addressed This Visit          Behavioral    Bipolar affective disorder, currently depressed, moderate (H) - Primary    Relevant Medications    ziprasidone (GEODON) 20 MG capsule    Attention deficit hyperactivity disorder, combined type    Relevant Medications    amphetamine-dextroamphetamine (ADDERALL) 10 MG tablet    amphetamine-dextroamphetamine (ADDERALL XR) 30 MG 24 hr capsule        Add in IR adderall 10 mg in the afternoons prn  Start geodon.       BMI  Estimated body mass index is 35.43 kg/m  as calculated from the following:    Height as of 1/30/25: 1.787 m (5' 10.37\").    Weight as of 1/30/25: 113.2 kg (249 lb 9.6 oz).   Weight management plan: Discussed healthy diet and exercise guidelines    Work on weight loss  Regular exercise      Subjective   Freddy is a 34 year old, presenting for the following health issues:  Recheck Medication (Discuss Adderall dosage and reaction to Lamotrigine,)        3/4/2025     6:45 AM   Additional Questions   Roomed by Shannon Pederson CMA   Accompanied by completed online         3/4/2025     6:45 AM   Patient Reported Additional Medications   Patient reports taking the following new medications none     Video Start Time: 8:05 AM    History of Present Illness       Reason for visit:  Adjust- increase- adhd medication dosage to meet daily needs.    He eats 2-3 servings of fruits and vegetables daily.He consumes 0 sweetened beverage(s) daily.He exercises with enough effort to increase his heart rate 30 to 60 minutes per day.  He exercises with enough effort to increase his heart rate 5 days per week.   He is taking medications regularly.        Adderall xr has " help some with focus and attention.   Wears off in the afternoons  Lamotrigine side effects in the form of a rash/hives. Resolved with stopping med. No other novel exposures.   Bipolar has done well. No recent manic spells. No current depression. Sleeping well.    Sober now x 90 days.   Review of Systems  Constitutional, HEENT, cardiovascular, pulmonary, GI, , musculoskeletal, neuro, skin, endocrine and psych systems are negative, except as otherwise noted.      Objective           Vitals:  No vitals were obtained today due to virtual visit.    Physical Exam   GENERAL: alert and no distress  EYES: Eyes grossly normal to inspection.  No discharge or erythema, or obvious scleral/conjunctival abnormalities.  RESP: No audible wheeze, cough, or visible cyanosis.    SKIN: Visible skin clear. No significant rash, abnormal pigmentation or lesions.  NEURO: Cranial nerves grossly intact.  Mentation and speech appropriate for age.  PSYCH: Appropriate affect, tone, and pace of words          Video-Visit Details    Type of service:  Video Visit   Video End Time:8:26 AM  Originating Location (pt. Location): Home    Distant Location (provider location):  On-site  Platform used for Video Visit: Kranthi  Signed Electronically by: Joe Patel PA-C

## 2025-04-28 ENCOUNTER — MYC REFILL (OUTPATIENT)
Dept: FAMILY MEDICINE | Facility: CLINIC | Age: 35
End: 2025-04-28
Payer: COMMERCIAL

## 2025-04-28 DIAGNOSIS — F31.32 BIPOLAR AFFECTIVE DISORDER, CURRENTLY DEPRESSED, MODERATE (H): ICD-10-CM

## 2025-04-28 DIAGNOSIS — F90.2 ATTENTION DEFICIT HYPERACTIVITY DISORDER, COMBINED TYPE: ICD-10-CM

## 2025-04-29 RX ORDER — ZIPRASIDONE HYDROCHLORIDE 20 MG/1
20 CAPSULE ORAL 2 TIMES DAILY WITH MEALS
Qty: 180 CAPSULE | Refills: 0 | Status: SHIPPED | OUTPATIENT
Start: 2025-04-29

## 2025-04-29 RX ORDER — DEXTROAMPHETAMINE SACCHARATE, AMPHETAMINE ASPARTATE MONOHYDRATE, DEXTROAMPHETAMINE SULFATE AND AMPHETAMINE SULFATE 7.5; 7.5; 7.5; 7.5 MG/1; MG/1; MG/1; MG/1
30 CAPSULE, EXTENDED RELEASE ORAL EVERY MORNING
Qty: 30 CAPSULE | Refills: 0 | Status: SHIPPED | OUTPATIENT
Start: 2025-04-29 | End: 2025-05-07

## 2025-04-29 RX ORDER — DEXTROAMPHETAMINE SACCHARATE, AMPHETAMINE ASPARTATE, DEXTROAMPHETAMINE SULFATE AND AMPHETAMINE SULFATE 2.5; 2.5; 2.5; 2.5 MG/1; MG/1; MG/1; MG/1
TABLET ORAL
Qty: 30 TABLET | Refills: 0 | Status: SHIPPED | OUTPATIENT
Start: 2025-04-29

## 2025-04-29 NOTE — TELEPHONE ENCOUNTER
[facility] message sent to the patient to schedule a virtual visit with Petar Jorge

## 2025-05-07 ENCOUNTER — TELEPHONE (OUTPATIENT)
Dept: FAMILY MEDICINE | Facility: CLINIC | Age: 35
End: 2025-05-07
Payer: COMMERCIAL

## 2025-05-07 ENCOUNTER — MYC REFILL (OUTPATIENT)
Dept: FAMILY MEDICINE | Facility: CLINIC | Age: 35
End: 2025-05-07
Payer: COMMERCIAL

## 2025-05-07 DIAGNOSIS — F90.2 ATTENTION DEFICIT HYPERACTIVITY DISORDER, COMBINED TYPE: ICD-10-CM

## 2025-05-07 DIAGNOSIS — F31.32 BIPOLAR AFFECTIVE DISORDER, CURRENTLY DEPRESSED, MODERATE (H): ICD-10-CM

## 2025-05-07 RX ORDER — DEXTROAMPHETAMINE SACCHARATE, AMPHETAMINE ASPARTATE MONOHYDRATE, DEXTROAMPHETAMINE SULFATE AND AMPHETAMINE SULFATE 7.5; 7.5; 7.5; 7.5 MG/1; MG/1; MG/1; MG/1
30 CAPSULE, EXTENDED RELEASE ORAL EVERY MORNING
Qty: 30 CAPSULE | Refills: 0 | OUTPATIENT
Start: 2025-05-07

## 2025-05-07 RX ORDER — ZIPRASIDONE HYDROCHLORIDE 20 MG/1
20 CAPSULE ORAL 2 TIMES DAILY WITH MEALS
Qty: 180 CAPSULE | Refills: 0 | OUTPATIENT
Start: 2025-05-07

## 2025-05-07 RX ORDER — DEXTROAMPHETAMINE SACCHARATE, AMPHETAMINE ASPARTATE MONOHYDRATE, DEXTROAMPHETAMINE SULFATE AND AMPHETAMINE SULFATE 3.75; 3.75; 3.75; 3.75 MG/1; MG/1; MG/1; MG/1
30 CAPSULE, EXTENDED RELEASE ORAL EVERY MORNING
Qty: 60 CAPSULE | Refills: 0 | Status: SHIPPED | OUTPATIENT
Start: 2025-05-07

## 2025-05-07 RX ORDER — DEXTROAMPHETAMINE SACCHARATE, AMPHETAMINE ASPARTATE MONOHYDRATE, DEXTROAMPHETAMINE SULFATE AND AMPHETAMINE SULFATE 3.75; 3.75; 3.75; 3.75 MG/1; MG/1; MG/1; MG/1
30 CAPSULE, EXTENDED RELEASE ORAL EVERY MORNING
Qty: 60 CAPSULE | Refills: 0 | Status: CANCELLED | OUTPATIENT
Start: 2025-05-07

## 2025-05-07 RX ORDER — DEXTROAMPHETAMINE SACCHARATE, AMPHETAMINE ASPARTATE, DEXTROAMPHETAMINE SULFATE AND AMPHETAMINE SULFATE 2.5; 2.5; 2.5; 2.5 MG/1; MG/1; MG/1; MG/1
TABLET ORAL
Qty: 30 TABLET | Refills: 0 | OUTPATIENT
Start: 2025-05-07

## 2025-05-07 NOTE — TELEPHONE ENCOUNTER
See 5/7/25 mychart refill request, scripts were refused as there are active scripts on file:    Ziprasidone HCl 20 mg Oral 2 TIMES DAILY WITH MEALS    Amphetamine-Dextroamphetamine Take one tab around 1-2 pm    Amphetamine-Dextroamphetamine 30 mg Oral EVERY MORNING    Called pt for clarification, pt stated they were informed by pharmacy the Adderall XR 30mg is on back order as there is a  shortage. Pt is requesting Adderall XR 15mg.     Called pharmacy at [phone] to confirm:  -Last filled immediate release 4/10/25, dispensed 30 capsules  -Last filled XR: 4/10/25, dispensed 30 capsules    Pharmacy confirmed the XR 30mg are on back order but they can accommodate for XR 15mg if pcp sends 2 of the Adderall XR 15mg. Pharmacy does have the 4/29/25 scripts for ziprasidone and Adderall 10mg.     Routing to pcp if appropriate to send alternative of Adderall XR 30mg to 2 capsules of Adderall XR 15mg.     Kristen Ho RN on 5/7/2025 at 5:16 PM

## 2025-05-07 NOTE — TELEPHONE ENCOUNTER
Patient called back  He stated that pharmacy advised that the adderall XR 30 mg is on backorder but they have  the XR 15 mg strength in stock    Please send in XR 15- take 2 cap daily    Brendan Tran RN  [facility]

## 2025-05-07 NOTE — TELEPHONE ENCOUNTER
Patient checking on status of refill and request for dose change  Explained that refills were sent on 4/29/25 but he is due for an appointment so would need to schedule, dose change can be discussed at an appointment.  Offered to schedule but he stated that he will schedule on his own another time    Brendan Tran RN  [facility]

## 2025-06-04 ENCOUNTER — VIRTUAL VISIT (OUTPATIENT)
Dept: FAMILY MEDICINE | Facility: CLINIC | Age: 35
End: 2025-06-04
Payer: COMMERCIAL

## 2025-06-04 DIAGNOSIS — F90.2 ATTENTION DEFICIT HYPERACTIVITY DISORDER, COMBINED TYPE: ICD-10-CM

## 2025-06-04 PROCEDURE — 98006 SYNCH AUDIO-VIDEO EST MOD 30: CPT | Performed by: PHYSICIAN ASSISTANT

## 2025-06-04 RX ORDER — DEXTROAMPHETAMINE SACCHARATE, AMPHETAMINE ASPARTATE, DEXTROAMPHETAMINE SULFATE AND AMPHETAMINE SULFATE 5; 5; 5; 5 MG/1; MG/1; MG/1; MG/1
TABLET ORAL
Qty: 30 TABLET | Refills: 0 | Status: SHIPPED | OUTPATIENT
Start: 2025-06-04

## 2025-06-04 ASSESSMENT — PATIENT HEALTH QUESTIONNAIRE - PHQ9
10. IF YOU CHECKED OFF ANY PROBLEMS, HOW DIFFICULT HAVE THESE PROBLEMS MADE IT FOR YOU TO DO YOUR WORK, TAKE CARE OF THINGS AT HOME, OR GET ALONG WITH OTHER PEOPLE: NOT DIFFICULT AT ALL
SUM OF ALL RESPONSES TO PHQ QUESTIONS 1-9: 2
SUM OF ALL RESPONSES TO PHQ QUESTIONS 1-9: 2

## 2025-06-04 ASSESSMENT — ANXIETY QUESTIONNAIRES
1. FEELING NERVOUS, ANXIOUS, OR ON EDGE: NOT AT ALL
GAD7 TOTAL SCORE: 0
5. BEING SO RESTLESS THAT IT IS HARD TO SIT STILL: NOT AT ALL
GAD7 TOTAL SCORE: 0
2. NOT BEING ABLE TO STOP OR CONTROL WORRYING: NOT AT ALL
4. TROUBLE RELAXING: NOT AT ALL
GAD7 TOTAL SCORE: 0
6. BECOMING EASILY ANNOYED OR IRRITABLE: NOT AT ALL
7. FEELING AFRAID AS IF SOMETHING AWFUL MIGHT HAPPEN: NOT AT ALL
7. FEELING AFRAID AS IF SOMETHING AWFUL MIGHT HAPPEN: NOT AT ALL
3. WORRYING TOO MUCH ABOUT DIFFERENT THINGS: NOT AT ALL
8. IF YOU CHECKED OFF ANY PROBLEMS, HOW DIFFICULT HAVE THESE MADE IT FOR YOU TO DO YOUR WORK, TAKE CARE OF THINGS AT HOME, OR GET ALONG WITH OTHER PEOPLE?: NOT DIFFICULT AT ALL
IF YOU CHECKED OFF ANY PROBLEMS ON THIS QUESTIONNAIRE, HOW DIFFICULT HAVE THESE PROBLEMS MADE IT FOR YOU TO DO YOUR WORK, TAKE CARE OF THINGS AT HOME, OR GET ALONG WITH OTHER PEOPLE: NOT DIFFICULT AT ALL

## 2025-06-04 NOTE — PROGRESS NOTES
Freddy is a 34 year old who is being evaluated via a billable video visit.    How would you like to obtain your AVS? MyChart  If the video visit is dropped, the invitation should be resent by: Text to cell phone: [phone]  Will anyone else be joining your video visit? No        Subjective   Freddy is a 34 year old, presenting for the following health issues:  A.D.H.D    Recheck of his variable mood. Geodon has helped quite a bit.   No depression or anhedonia. No jj. No psychotic episodes.   Video Start Time: 3:36 PM    A.D.H.D      Adult ADD/ADHD Medication Follow up  Works well in the AM. Seems to wear off in the afternoon.   The IR dose helps some in the afternoon.       1/21/2025    12:40 PM 3/3/2025    11:33 AM 6/4/2025     3:12 PM   PHQ   PHQ-9 Total Score 6  5  2    Q9: Thoughts of better off dead/self-harm past 2 weeks Not at all Not at all Not at all       Patient-reported          1/17/2023    12:32 PM 1/21/2025    12:40 PM 6/4/2025     3:13 PM   BENNETT-7 SCORE   Total Score  2 (minimal anxiety) 0 (minimal anxiety)   Total Score 1 2  0        Patient-reported            Review of Systems  Constitutional, HEENT, cardiovascular, pulmonary, GI, , musculoskeletal, neuro, skin, endocrine and psych systems are negative, except as otherwise noted.      Objective           Vitals:  No vitals were obtained today due to virtual visit.    Physical Exam   GENERAL: alert and no distress  EYES: Eyes grossly normal to inspection.  No discharge or erythema, or obvious scleral/conjunctival abnormalities.  RESP: No audible wheeze, cough, or visible cyanosis.    SKIN: Visible skin clear. No significant rash, abnormal pigmentation or lesions.  NEURO: Cranial nerves grossly intact.  Mentation and speech appropriate for age.  PSYCH: Appropriate affect, tone, and pace of words    Freddy was seen today for a.d.h.d.    Diagnoses and all orders for this visit:    Attention deficit hyperactivity disorder, combined type  -      amphetamine-dextroamphetamine (ADDERALL) 20 MG tablet; Take one tab around 1-2 pm      Continue geodon.  Continue with adderall xr 30 mg qam and inc adderall IR to 20 mg around 1 pm.  Recheck in 6 mos .      Video-Visit Details    Type of service:  Video Visit   Video End Time:4:12 PM  Originating Location (pt. Location): Home    Distant Location (provider location):  On-site  Platform used for Video Visit: Kranthi  Signed Electronically by: Joe Patel PA-C

## 2025-08-09 ENCOUNTER — HEALTH MAINTENANCE LETTER (OUTPATIENT)
Age: 35
End: 2025-08-09